# Patient Record
Sex: FEMALE | Race: WHITE | NOT HISPANIC OR LATINO | Employment: OTHER | URBAN - METROPOLITAN AREA
[De-identification: names, ages, dates, MRNs, and addresses within clinical notes are randomized per-mention and may not be internally consistent; named-entity substitution may affect disease eponyms.]

---

## 2023-03-09 ENCOUNTER — TELEPHONE (OUTPATIENT)
Dept: PRIMARY CARE | Facility: CLINIC | Age: 78
End: 2023-03-09
Payer: MEDICARE

## 2023-03-09 NOTE — TELEPHONE ENCOUNTER
PT WANTS TO KNOW IF SHE SHOULD CONTINUE USING ADVAIR AND SINCE SHES NO LONGER HAVING THE ISSUE SHOULD SHE STILL SEE DR. HOYOS. PLEASE ADVISE.

## 2023-03-25 DIAGNOSIS — J45.40 MODERATE PERSISTENT ASTHMA WITHOUT COMPLICATION (HHS-HCC): Primary | ICD-10-CM

## 2023-03-25 DIAGNOSIS — E78.5 HYPERLIPIDEMIA, UNSPECIFIED HYPERLIPIDEMIA TYPE: ICD-10-CM

## 2023-03-27 RX ORDER — FLUTICASONE PROPIONATE AND SALMETEROL 250; 50 UG/1; UG/1
POWDER RESPIRATORY (INHALATION)
Qty: 60 EACH | Refills: 0 | Status: SHIPPED | OUTPATIENT
Start: 2023-03-27 | End: 2023-03-30 | Stop reason: SDUPTHER

## 2023-03-28 RX ORDER — SIMVASTATIN 20 MG/1
20 TABLET, FILM COATED ORAL DAILY
Qty: 90 TABLET | Refills: 1 | Status: SHIPPED | OUTPATIENT
Start: 2023-03-28 | End: 2023-09-27 | Stop reason: SDUPTHER

## 2023-03-28 RX ORDER — SIMVASTATIN 20 MG/1
20 TABLET, FILM COATED ORAL DAILY
COMMUNITY
End: 2023-03-28 | Stop reason: SDUPTHER

## 2023-03-30 DIAGNOSIS — J45.40 MODERATE PERSISTENT ASTHMA WITHOUT COMPLICATION (HHS-HCC): ICD-10-CM

## 2023-03-30 RX ORDER — FLUTICASONE PROPIONATE AND SALMETEROL 250; 50 UG/1; UG/1
POWDER RESPIRATORY (INHALATION)
Qty: 60 EACH | Refills: 1 | Status: SHIPPED | OUTPATIENT
Start: 2023-03-30 | End: 2023-06-20

## 2023-04-24 NOTE — PROGRESS NOTES
"Subjective   Reason for Visit: Sunni Pérez is an 77 y.o. female here for a Medicare Wellness visit and followup of chronic medical conditions.     - For the last week had experienced a \"stomach thing\" without vomiting, has noted soft stools but not normal bowel movements, no diarrhea, that lasted two days, has had some stomach discomfort but did not stop her from doing anything, though again it started again this morning. Her stomach has not been exactly right for about a week. Appetite is the same, no melena or hematochezia, no constitutional symptoms, overall feels ok, able to eat and drink.  Symptoms initially got better and then got worse.     Past Medical, Surgical, and Family History reviewed and updated in chart.PMHx:  -Invasive mammary carcinoma, ductal carcinoma in situ status post right partial mastectomy and sentinel lymph node biopsy with negative margins on June 2019, opted not to pursue radiation, on anastrozole, last seen by heme-onc in July 2022 recommended to see dermatology twice a year, followed by Dr. Sutherland has had recent mammogram which showed stable findings recommended one year followup.   -Asthma - moderate persistent on advair daily and prn, recommended for PFTs. She gets frequent asthmatic bronchitis episodes. She had another episode a few months ago where she took flonase which helped her symptoms as well as ayr. There is no prn use of advair.   - Macular degeneration followed by optho q6 months   - CKD 3  - HTN -  on amlodipine 5mg history of hypertensive emergency post covid vaccination, measures home bP readings ranging between 120s-130s at the most.   - Depression and anxiety on venlafaxine- working well for her.   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 Tania Irvin.   - HLD - simvastatin 40 + 20mg   - Vitamin D deficiency on 6000 units   - Osteopenia - last bone density 1/22. on vitamin D   - GERD on omeprazole 20mg " longstanding   - LBP chronic inability to exercise   - Prediabetes with metabolic syndrome     Social:   - Retired - previously worked at a bank   - Lives at home with    - Former remote smoker (1PPD x 2 years)     Reviewed all medications by prescribing practitioner or clinical pharmacist (such as prescriptions, OTCs, herbal therapies and supplements) and documented in the medical record.      HPI    Patient Care Team:  Carina Gillette DO as PCP - General  Gretel Worthington MD as PCP - [facility] Attributed Provider     Review of Systems    Objective   Vitals:  /72   Pulse 72   Temp 36.6 °C (97.9 °F)   Wt 77.3 kg (170 lb 6 oz)   BMI 33.27 kg/m²       Physical Exam  General: Appears comfortable, NAD, appropriate affect  HEENT: NCAT, EOMI, pupils symmetric, no conjunctival erythema   Neck: Supple, no LAD   Heart: RRR S1 S2 no murmurs appreciated   Lungs: CTA bilaterally, no rhonchi, rales, or wheezes   Abdomen: Soft, NT/ND, no rebound or guarding, NABS   Extremities: no cyanosis or edema appreciated  Neuro: AAO x 3, answers questions appropriately, no FND, gait unremarkable    Assessment/Plan   Problem List Items Addressed This Visit          Respiratory    Moderate persistent asthma without complication    Current Assessment & Plan     Now maintained on advair with significant improvement in symptoms, no recent exacerbations and has improved frequency of exacerbations.   Will continue same for now, hold off on pulmonology referral. Encouragement provided, declines PFTs at present.            Circulatory    Benign essential HTN - Primary    Current Assessment & Plan     Controlled today on amlodipine, no changes in regimen.   Continue lifestyle modifications.         Relevant Orders    CBC and Auto Differential    Comprehensive Metabolic Panel    Lipid Panel    TSH with reflex to Free T4 if abnormal    Vitamin D, Total       Digestive    Gastroesophageal reflux disease without esophagitis     Current Assessment & Plan     On chronic PPI not interested in further surveillance EGD at present.            Genitourinary    Stage 3a chronic kidney disease    Current Assessment & Plan     Mild, due for repeat bloodwork, will obtain, likely secondary to hypertension. Check urine studies.         Relevant Orders    Hemoglobin A1C    Urinalysis with Reflex Microscopic    Follow Up In [facility] - PCP       Musculoskeletal    Osteopenia    Current Assessment & Plan     Last bone density 1/2022 due for repeat in 1 year.   Continue calcium and vitamin D supplementation, continue weight bearing exercises if able.            Endocrine/Metabolic    Prediabetes    Current Assessment & Plan     Healthy lifestyle reviewed.         Relevant Orders    Hemoglobin A1C       Other    Depression with anxiety    Current Assessment & Plan     Controlled on venlafaxine not interested in dosage adjustment.         Malignant neoplasm of female breast, unspecified estrogen receptor status, unspecified laterality, unspecified site of breast (CMS/[facility])    Current Assessment & Plan     Maintained on anastrazole, s/p right partial mastectomy, recent mammogram wnl, seen by Dr. Sutherland recommended one year followup.          Hypercholesterolemia    Current Assessment & Plan     On simvastatin 40+20 with persistent hypertriglyceridemia, counseling provided on lifestyle modifications. May consider switching to higher potency statin. Will recheck lipid profile.          Macular degeneration    Current Assessment & Plan     Followed by optho q6 months          Encounter for preventative adult health care examination    Current Assessment & Plan     Adult health exam   - Colonoscopy or fecal testing declined   - Mammo UTD   - Pap no longer required   - Skin followed q6 months   - Vaccinations: UTD with flu, pneumococcal, Tdap will consider, Shingrix recommended (has had shingles in the past)           Other Visit Diagnoses        Disorder of bone, unspecified        Relevant Orders    Vitamin D, Total    Healthcare maintenance        Relevant Orders    Follow Up In [facility] - PCP    Routine general medical examination at health care facility                 Followup yearly

## 2023-04-25 ENCOUNTER — OFFICE VISIT (OUTPATIENT)
Dept: PRIMARY CARE | Facility: CLINIC | Age: 78
End: 2023-04-25
Payer: MEDICARE

## 2023-04-25 VITALS
DIASTOLIC BLOOD PRESSURE: 72 MMHG | TEMPERATURE: 97.9 F | WEIGHT: 170.38 LBS | SYSTOLIC BLOOD PRESSURE: 131 MMHG | BODY MASS INDEX: 33.27 KG/M2 | HEART RATE: 72 BPM

## 2023-04-25 DIAGNOSIS — Z00.00 ROUTINE GENERAL MEDICAL EXAMINATION AT HEALTH CARE FACILITY: ICD-10-CM

## 2023-04-25 DIAGNOSIS — N18.31 STAGE 3A CHRONIC KIDNEY DISEASE (MULTI): ICD-10-CM

## 2023-04-25 DIAGNOSIS — H35.30 MACULAR DEGENERATION, UNSPECIFIED LATERALITY, UNSPECIFIED TYPE: ICD-10-CM

## 2023-04-25 DIAGNOSIS — Z00.00 ENCOUNTER FOR PREVENTATIVE ADULT HEALTH CARE EXAMINATION: ICD-10-CM

## 2023-04-25 DIAGNOSIS — R73.03 PREDIABETES: ICD-10-CM

## 2023-04-25 DIAGNOSIS — E78.00 HYPERCHOLESTEROLEMIA: ICD-10-CM

## 2023-04-25 DIAGNOSIS — C50.919 MALIGNANT NEOPLASM OF FEMALE BREAST, UNSPECIFIED ESTROGEN RECEPTOR STATUS, UNSPECIFIED LATERALITY, UNSPECIFIED SITE OF BREAST (MULTI): ICD-10-CM

## 2023-04-25 DIAGNOSIS — Z00.00 HEALTHCARE MAINTENANCE: ICD-10-CM

## 2023-04-25 DIAGNOSIS — F41.8 DEPRESSION WITH ANXIETY: ICD-10-CM

## 2023-04-25 DIAGNOSIS — J45.40 MODERATE PERSISTENT ASTHMA WITHOUT COMPLICATION (HHS-HCC): ICD-10-CM

## 2023-04-25 DIAGNOSIS — I10 BENIGN ESSENTIAL HTN: Primary | ICD-10-CM

## 2023-04-25 DIAGNOSIS — K21.9 GASTROESOPHAGEAL REFLUX DISEASE WITHOUT ESOPHAGITIS: ICD-10-CM

## 2023-04-25 DIAGNOSIS — M85.80 OSTEOPENIA, UNSPECIFIED LOCATION: ICD-10-CM

## 2023-04-25 DIAGNOSIS — M89.9 DISORDER OF BONE, UNSPECIFIED: ICD-10-CM

## 2023-04-25 PROBLEM — N18.30 CKD (CHRONIC KIDNEY DISEASE), STAGE III (MULTI): Status: ACTIVE | Noted: 2023-04-25

## 2023-04-25 PROBLEM — Z78.9 HEALTH MAINTENANCE ALTERATION: Status: ACTIVE | Noted: 2023-04-25

## 2023-04-25 PROBLEM — Z78.9 HEALTH MAINTENANCE ALTERATION: Status: RESOLVED | Noted: 2023-04-25 | Resolved: 2023-04-25

## 2023-04-25 PROBLEM — J45.909 ASTHMA (HHS-HCC): Status: ACTIVE | Noted: 2023-04-25

## 2023-04-25 LAB
ALANINE AMINOTRANSFERASE (SGPT) (U/L) IN SER/PLAS: 22 U/L (ref 7–45)
ALBUMIN (G/DL) IN SER/PLAS: 4.4 G/DL (ref 3.4–5)
ALKALINE PHOSPHATASE (U/L) IN SER/PLAS: 124 U/L (ref 33–136)
ANION GAP IN SER/PLAS: 13 MMOL/L (ref 10–20)
APPEARANCE, URINE: ABNORMAL
ASPARTATE AMINOTRANSFERASE (SGOT) (U/L) IN SER/PLAS: 22 U/L (ref 9–39)
BASOPHILS (10*3/UL) IN BLOOD BY AUTOMATED COUNT: 0.05 X10E9/L (ref 0–0.1)
BASOPHILS/100 LEUKOCYTES IN BLOOD BY AUTOMATED COUNT: 0.6 % (ref 0–2)
BILIRUBIN TOTAL (MG/DL) IN SER/PLAS: 0.5 MG/DL (ref 0–1.2)
BILIRUBIN, URINE: NEGATIVE
BLOOD, URINE: NEGATIVE
CALCIDIOL (25 OH VITAMIN D3) (NG/ML) IN SER/PLAS: 45 NG/ML
CALCIUM (MG/DL) IN SER/PLAS: 10 MG/DL (ref 8.6–10.6)
CARBON DIOXIDE, TOTAL (MMOL/L) IN SER/PLAS: 26 MMOL/L (ref 21–32)
CHLORIDE (MMOL/L) IN SER/PLAS: 108 MMOL/L (ref 98–107)
CHOLESTEROL (MG/DL) IN SER/PLAS: 163 MG/DL (ref 0–199)
CHOLESTEROL IN HDL (MG/DL) IN SER/PLAS: 41.2 MG/DL
CHOLESTEROL/HDL RATIO: 4
COLOR, URINE: ABNORMAL
CREATININE (MG/DL) IN SER/PLAS: 0.87 MG/DL (ref 0.5–1.05)
EOSINOPHILS (10*3/UL) IN BLOOD BY AUTOMATED COUNT: 0.42 X10E9/L (ref 0–0.4)
EOSINOPHILS/100 LEUKOCYTES IN BLOOD BY AUTOMATED COUNT: 5.1 % (ref 0–6)
ERYTHROCYTE DISTRIBUTION WIDTH (RATIO) BY AUTOMATED COUNT: 14.2 % (ref 11.5–14.5)
ERYTHROCYTE MEAN CORPUSCULAR HEMOGLOBIN CONCENTRATION (G/DL) BY AUTOMATED: 31.5 G/DL (ref 32–36)
ERYTHROCYTE MEAN CORPUSCULAR VOLUME (FL) BY AUTOMATED COUNT: 86 FL (ref 80–100)
ERYTHROCYTES (10*6/UL) IN BLOOD BY AUTOMATED COUNT: 4.43 X10E12/L (ref 4–5.2)
GFR FEMALE: 68 ML/MIN/1.73M2
GLUCOSE (MG/DL) IN SER/PLAS: 93 MG/DL (ref 74–99)
GLUCOSE, URINE: NEGATIVE MG/DL
HEMATOCRIT (%) IN BLOOD BY AUTOMATED COUNT: 38.1 % (ref 36–46)
HEMOGLOBIN (G/DL) IN BLOOD: 12 G/DL (ref 12–16)
HYALINE CASTS, URINE: ABNORMAL /LPF
IMMATURE GRANULOCYTES/100 LEUKOCYTES IN BLOOD BY AUTOMATED COUNT: 0.6 % (ref 0–0.9)
KETONES, URINE: NEGATIVE MG/DL
LDL: ABNORMAL MG/DL (ref 0–99)
LEUKOCYTE ESTERASE, URINE: ABNORMAL
LEUKOCYTES (10*3/UL) IN BLOOD BY AUTOMATED COUNT: 8.2 X10E9/L (ref 4.4–11.3)
LYMPHOCYTES (10*3/UL) IN BLOOD BY AUTOMATED COUNT: 2.42 X10E9/L (ref 0.8–3)
LYMPHOCYTES/100 LEUKOCYTES IN BLOOD BY AUTOMATED COUNT: 29.7 % (ref 13–44)
MONOCYTES (10*3/UL) IN BLOOD BY AUTOMATED COUNT: 0.59 X10E9/L (ref 0.05–0.8)
MONOCYTES/100 LEUKOCYTES IN BLOOD BY AUTOMATED COUNT: 7.2 % (ref 2–10)
MUCUS, URINE: ABNORMAL /LPF
NEUTROPHILS (10*3/UL) IN BLOOD BY AUTOMATED COUNT: 4.63 X10E9/L (ref 1.6–5.5)
NEUTROPHILS/100 LEUKOCYTES IN BLOOD BY AUTOMATED COUNT: 56.8 % (ref 40–80)
NITRITE, URINE: NEGATIVE
NON HDL CHOLESTEROL: 122 MG/DL
NRBC (PER 100 WBCS) BY AUTOMATED COUNT: 0 /100 WBC (ref 0–0)
PH, URINE: 5 (ref 5–8)
PLATELETS (10*3/UL) IN BLOOD AUTOMATED COUNT: 230 X10E9/L (ref 150–450)
POTASSIUM (MMOL/L) IN SER/PLAS: 4 MMOL/L (ref 3.5–5.3)
PROTEIN TOTAL: 6.7 G/DL (ref 6.4–8.2)
PROTEIN, URINE: NEGATIVE MG/DL
RBC, URINE: 2 /HPF (ref 0–5)
SODIUM (MMOL/L) IN SER/PLAS: 143 MMOL/L (ref 136–145)
SPECIFIC GRAVITY, URINE: 1.02 (ref 1–1.03)
SQUAMOUS EPITHELIAL CELLS, URINE: 1 /HPF
THYROTROPIN (MIU/L) IN SER/PLAS BY DETECTION LIMIT <= 0.05 MIU/L: 1.83 MIU/L (ref 0.44–3.98)
TRIGLYCERIDE (MG/DL) IN SER/PLAS: 408 MG/DL (ref 0–149)
UREA NITROGEN (MG/DL) IN SER/PLAS: 22 MG/DL (ref 6–23)
UROBILINOGEN, URINE: <2 MG/DL (ref 0–1.9)
VLDL: ABNORMAL MG/DL (ref 0–40)
WBC, URINE: 1 /HPF (ref 0–5)

## 2023-04-25 PROCEDURE — 3078F DIAST BP <80 MM HG: CPT | Performed by: INTERNAL MEDICINE

## 2023-04-25 PROCEDURE — 3075F SYST BP GE 130 - 139MM HG: CPT | Performed by: INTERNAL MEDICINE

## 2023-04-25 PROCEDURE — 99214 OFFICE O/P EST MOD 30 MIN: CPT | Performed by: INTERNAL MEDICINE

## 2023-04-25 PROCEDURE — G0439 PPPS, SUBSEQ VISIT: HCPCS | Performed by: INTERNAL MEDICINE

## 2023-04-25 PROCEDURE — 1170F FXNL STATUS ASSESSED: CPT | Performed by: INTERNAL MEDICINE

## 2023-04-25 PROCEDURE — 1159F MED LIST DOCD IN RCRD: CPT | Performed by: INTERNAL MEDICINE

## 2023-04-25 PROCEDURE — 1160F RVW MEDS BY RX/DR IN RCRD: CPT | Performed by: INTERNAL MEDICINE

## 2023-04-25 PROCEDURE — 1157F ADVNC CARE PLAN IN RCRD: CPT | Performed by: INTERNAL MEDICINE

## 2023-04-25 PROCEDURE — 1036F TOBACCO NON-USER: CPT | Performed by: INTERNAL MEDICINE

## 2023-04-25 RX ORDER — OMEPRAZOLE 20 MG/1
20 CAPSULE, DELAYED RELEASE ORAL
COMMUNITY
End: 2023-05-16

## 2023-04-25 RX ORDER — VIT C/E/ZN/COPPR/LUTEIN/ZEAXAN 250MG-90MG
CAPSULE ORAL
COMMUNITY

## 2023-04-25 RX ORDER — AMLODIPINE BESYLATE 5 MG/1
5 TABLET ORAL DAILY
COMMUNITY
End: 2023-05-16

## 2023-04-25 RX ORDER — FLUTICASONE PROPIONATE 50 MCG
2 SPRAY, SUSPENSION (ML) NASAL DAILY
COMMUNITY
Start: 2023-01-20

## 2023-04-25 RX ORDER — SIMVASTATIN 40 MG/1
40 TABLET, FILM COATED ORAL DAILY
COMMUNITY
End: 2023-05-30 | Stop reason: SDUPTHER

## 2023-04-25 RX ORDER — ALBUTEROL SULFATE 0.83 MG/ML
SOLUTION RESPIRATORY (INHALATION)
COMMUNITY
Start: 2023-02-10 | End: 2024-03-21 | Stop reason: WASHOUT

## 2023-04-25 RX ORDER — CHOLECALCIFEROL (VITAMIN D3) 50 MCG
TABLET ORAL
COMMUNITY

## 2023-04-25 RX ORDER — VENLAFAXINE HYDROCHLORIDE 150 MG/1
150 CAPSULE, EXTENDED RELEASE ORAL DAILY
COMMUNITY
End: 2023-05-16

## 2023-04-25 RX ORDER — ALBUTEROL SULFATE 90 UG/1
AEROSOL, METERED RESPIRATORY (INHALATION)
COMMUNITY
Start: 2023-01-19 | End: 2024-03-21 | Stop reason: WASHOUT

## 2023-04-25 ASSESSMENT — ACTIVITIES OF DAILY LIVING (ADL)
BATHING: INDEPENDENT
MANAGING_FINANCES: INDEPENDENT
DOING_HOUSEWORK: INDEPENDENT
GROCERY_SHOPPING: INDEPENDENT
TAKING_MEDICATION: INDEPENDENT
DRESSING: INDEPENDENT

## 2023-04-25 ASSESSMENT — PATIENT HEALTH QUESTIONNAIRE - PHQ9
SUM OF ALL RESPONSES TO PHQ9 QUESTIONS 1 AND 2: 0
1. LITTLE INTEREST OR PLEASURE IN DOING THINGS: NOT AT ALL
2. FEELING DOWN, DEPRESSED OR HOPELESS: NOT AT ALL

## 2023-04-25 NOTE — ASSESSMENT & PLAN NOTE
On simvastatin 40+20 with persistent hypertriglyceridemia, counseling provided on lifestyle modifications. May consider switching to higher potency statin. Will recheck lipid profile.

## 2023-04-25 NOTE — ASSESSMENT & PLAN NOTE
Last bone density 1/2022 due for repeat in 1 year.   Continue calcium and vitamin D supplementation, continue weight bearing exercises if able.

## 2023-04-25 NOTE — ASSESSMENT & PLAN NOTE
Adult health exam   - Colonoscopy or fecal testing declined   - Mammo UTD   - Pap no longer required   - Skin followed q6 months   - Vaccinations: UTD with flu, pneumococcal, Tdap will consider, Shingrix recommended (has had shingles in the past)

## 2023-04-25 NOTE — ASSESSMENT & PLAN NOTE
Mild, due for repeat bloodwork, will obtain, likely secondary to hypertension. Check urine studies.

## 2023-04-25 NOTE — ASSESSMENT & PLAN NOTE
Now maintained on advair with significant improvement in symptoms, no recent exacerbations and has improved frequency of exacerbations.   Will continue same for now, hold off on pulmonology referral. Encouragement provided, declines PFTs at present.

## 2023-04-25 NOTE — ASSESSMENT & PLAN NOTE
Maintained on anastrazole, s/p right partial mastectomy, recent mammogram wnl, seen by Dr. Sutherland recommended one year followup.

## 2023-05-04 ENCOUNTER — TELEPHONE (OUTPATIENT)
Dept: PRIMARY CARE | Facility: CLINIC | Age: 78
End: 2023-05-04

## 2023-05-16 DIAGNOSIS — I10 BENIGN ESSENTIAL HTN: ICD-10-CM

## 2023-05-16 DIAGNOSIS — F41.8 DEPRESSION WITH ANXIETY: ICD-10-CM

## 2023-05-16 DIAGNOSIS — K21.9 GASTROESOPHAGEAL REFLUX DISEASE WITHOUT ESOPHAGITIS: Primary | ICD-10-CM

## 2023-05-16 RX ORDER — AMLODIPINE BESYLATE 5 MG/1
TABLET ORAL
Qty: 90 TABLET | Refills: 0 | Status: SHIPPED | OUTPATIENT
Start: 2023-05-16 | End: 2023-08-15

## 2023-05-16 RX ORDER — VENLAFAXINE HYDROCHLORIDE 150 MG/1
CAPSULE, EXTENDED RELEASE ORAL
Qty: 90 CAPSULE | Refills: 0 | Status: SHIPPED | OUTPATIENT
Start: 2023-05-16 | End: 2023-05-24

## 2023-05-16 RX ORDER — OMEPRAZOLE 20 MG/1
CAPSULE, DELAYED RELEASE ORAL
Qty: 90 CAPSULE | Refills: 0 | Status: SHIPPED | OUTPATIENT
Start: 2023-05-16 | End: 2023-08-15

## 2023-05-24 DIAGNOSIS — F41.8 DEPRESSION WITH ANXIETY: ICD-10-CM

## 2023-05-24 RX ORDER — VENLAFAXINE HYDROCHLORIDE 150 MG/1
CAPSULE, EXTENDED RELEASE ORAL
Qty: 90 CAPSULE | Refills: 3 | Status: SHIPPED | OUTPATIENT
Start: 2023-05-24

## 2023-05-30 DIAGNOSIS — E78.00 HYPERCHOLESTEROLEMIA: Primary | ICD-10-CM

## 2023-05-30 RX ORDER — SIMVASTATIN 40 MG/1
40 TABLET, FILM COATED ORAL DAILY
Qty: 90 TABLET | Refills: 1 | Status: SHIPPED | OUTPATIENT
Start: 2023-05-30 | End: 2023-11-27

## 2023-06-19 DIAGNOSIS — J45.40 MODERATE PERSISTENT ASTHMA WITHOUT COMPLICATION (HHS-HCC): ICD-10-CM

## 2023-06-20 RX ORDER — FLUTICASONE PROPIONATE AND SALMETEROL 250; 50 UG/1; UG/1
POWDER RESPIRATORY (INHALATION)
Qty: 60 EACH | Refills: 2 | Status: SHIPPED | OUTPATIENT
Start: 2023-06-20 | End: 2023-09-11

## 2023-09-09 DIAGNOSIS — J45.40 MODERATE PERSISTENT ASTHMA WITHOUT COMPLICATION (HHS-HCC): ICD-10-CM

## 2023-09-11 RX ORDER — FLUTICASONE PROPIONATE AND SALMETEROL 250; 50 UG/1; UG/1
POWDER RESPIRATORY (INHALATION)
Qty: 60 EACH | Refills: 3 | Status: SHIPPED | OUTPATIENT
Start: 2023-09-11 | End: 2024-01-04

## 2023-09-27 DIAGNOSIS — E78.5 HYPERLIPIDEMIA, UNSPECIFIED HYPERLIPIDEMIA TYPE: ICD-10-CM

## 2023-09-27 RX ORDER — SIMVASTATIN 20 MG/1
20 TABLET, FILM COATED ORAL DAILY
Qty: 90 TABLET | Refills: 1 | Status: SHIPPED | OUTPATIENT
Start: 2023-09-27

## 2023-11-24 DIAGNOSIS — E78.00 HYPERCHOLESTEROLEMIA: ICD-10-CM

## 2023-11-27 RX ORDER — SIMVASTATIN 40 MG/1
40 TABLET, FILM COATED ORAL DAILY
Qty: 90 TABLET | Refills: 0 | Status: SHIPPED | OUTPATIENT
Start: 2023-11-27 | End: 2024-02-28

## 2023-12-31 DIAGNOSIS — J45.40 MODERATE PERSISTENT ASTHMA WITHOUT COMPLICATION (HHS-HCC): ICD-10-CM

## 2024-01-04 RX ORDER — FLUTICASONE PROPIONATE AND SALMETEROL 250; 50 UG/1; UG/1
POWDER RESPIRATORY (INHALATION)
Qty: 60 EACH | Refills: 1 | Status: SHIPPED | OUTPATIENT
Start: 2024-01-04 | End: 2024-04-16

## 2024-01-12 ENCOUNTER — TELEPHONE (OUTPATIENT)
Dept: PRIMARY CARE | Facility: CLINIC | Age: 79
End: 2024-01-12
Payer: MEDICARE

## 2024-02-20 ENCOUNTER — APPOINTMENT (OUTPATIENT)
Dept: OBSTETRICS AND GYNECOLOGY | Facility: CLINIC | Age: 79
End: 2024-02-20
Payer: MEDICARE

## 2024-02-25 DIAGNOSIS — E78.00 HYPERCHOLESTEROLEMIA: ICD-10-CM

## 2024-02-28 PROBLEM — H26.9 CATARACT: Status: ACTIVE | Noted: 2024-02-28

## 2024-02-28 PROBLEM — H35.3131 NONEXUDATIVE AGE-RELATED MACULAR DEGENERATION, BILATERAL, EARLY DRY STAGE: Status: ACTIVE | Noted: 2017-10-11

## 2024-02-28 PROBLEM — H43.822 VITREOMACULAR TRACTION SYNDROME OF LEFT EYE: Status: ACTIVE | Noted: 2017-03-06

## 2024-02-28 PROBLEM — N95.2 ATROPHIC VAGINITIS: Status: ACTIVE | Noted: 2024-02-28

## 2024-02-28 PROBLEM — B02.9 HERPES ZOSTER: Status: ACTIVE | Noted: 2024-02-28

## 2024-02-28 PROBLEM — J01.90 ACUTE SINUSITIS: Status: ACTIVE | Noted: 2024-02-28

## 2024-02-28 PROBLEM — R05.9 COUGH: Status: ACTIVE | Noted: 2024-02-28

## 2024-02-28 PROBLEM — N64.4 BREAST PAIN, LEFT: Status: ACTIVE | Noted: 2024-02-28

## 2024-02-28 PROBLEM — H18.231 SECONDARY CORNEAL EDEMA OF RIGHT EYE: Status: ACTIVE | Noted: 2017-09-06

## 2024-02-28 PROBLEM — R06.09 DYSPNEA ON EFFORT: Status: ACTIVE | Noted: 2024-02-28

## 2024-02-28 PROBLEM — H35.711 CENTRAL SEROUS CHORIORETINOPATHY, RIGHT EYE: Status: ACTIVE | Noted: 2017-08-23

## 2024-02-28 PROBLEM — R10.30 ABDOMINAL PAIN, LOWER: Status: ACTIVE | Noted: 2024-02-28

## 2024-02-28 PROBLEM — M54.50 BACK PAIN, LUMBOSACRAL: Status: ACTIVE | Noted: 2024-02-28

## 2024-02-28 PROBLEM — H04.123 DRY EYE SYNDROME OF BILATERAL LACRIMAL GLANDS: Status: ACTIVE | Noted: 2017-09-11

## 2024-02-28 PROBLEM — R68.89 NONSPECIFIC ABNORMAL FINDING: Status: ACTIVE | Noted: 2024-02-28

## 2024-02-28 PROBLEM — R09.02 HYPOXIA: Status: ACTIVE | Noted: 2024-02-28

## 2024-02-28 PROBLEM — N84.1 ENDOCERVICAL POLYP: Status: ACTIVE | Noted: 2024-02-28

## 2024-02-28 RX ORDER — LORAZEPAM 0.5 MG/1
TABLET ORAL
COMMUNITY
Start: 2021-09-27 | End: 2024-03-21 | Stop reason: WASHOUT

## 2024-02-28 RX ORDER — SIMVASTATIN 40 MG/1
40 TABLET, FILM COATED ORAL DAILY
Qty: 90 TABLET | Refills: 0 | Status: SHIPPED | OUTPATIENT
Start: 2024-02-28 | End: 2024-03-21 | Stop reason: WASHOUT

## 2024-02-28 RX ORDER — MULTIVITAMIN
TABLET ORAL
COMMUNITY

## 2024-02-28 RX ORDER — SODIUM CHLORIDE 0.65 %
AEROSOL, SPRAY (ML) NASAL 4 TIMES DAILY
COMMUNITY

## 2024-02-28 RX ORDER — ACETAMINOPHEN 325 MG/1
650 TABLET ORAL EVERY 4 HOURS PRN
COMMUNITY
Start: 2014-10-25

## 2024-02-28 RX ORDER — IBANDRONATE SODIUM 150 MG/1
TABLET, FILM COATED ORAL
COMMUNITY
Start: 2022-02-11 | End: 2024-03-21 | Stop reason: WASHOUT

## 2024-02-28 RX ORDER — ANASTROZOLE 1 MG/1
1 TABLET ORAL DAILY
COMMUNITY
End: 2024-03-13 | Stop reason: SDUPTHER

## 2024-03-13 ENCOUNTER — TELEPHONE (OUTPATIENT)
Dept: HEMATOLOGY/ONCOLOGY | Facility: HOSPITAL | Age: 79
End: 2024-03-13

## 2024-03-13 DIAGNOSIS — C50.919 MALIGNANT NEOPLASM OF FEMALE BREAST, UNSPECIFIED ESTROGEN RECEPTOR STATUS, UNSPECIFIED LATERALITY, UNSPECIFIED SITE OF BREAST (MULTI): Primary | ICD-10-CM

## 2024-03-13 RX ORDER — ANASTROZOLE 1 MG/1
1 TABLET ORAL DAILY
Qty: 30 TABLET | Refills: 2 | Status: SHIPPED | OUTPATIENT
Start: 2024-03-13

## 2024-03-21 ENCOUNTER — OFFICE VISIT (OUTPATIENT)
Dept: PRIMARY CARE | Facility: CLINIC | Age: 79
End: 2024-03-21
Payer: MEDICARE

## 2024-03-21 VITALS
WEIGHT: 170 LBS | BODY MASS INDEX: 33.2 KG/M2 | DIASTOLIC BLOOD PRESSURE: 69 MMHG | TEMPERATURE: 98.2 F | SYSTOLIC BLOOD PRESSURE: 129 MMHG | HEART RATE: 65 BPM | OXYGEN SATURATION: 96 %

## 2024-03-21 DIAGNOSIS — H35.30 MACULAR DEGENERATION, UNSPECIFIED LATERALITY, UNSPECIFIED TYPE: Primary | ICD-10-CM

## 2024-03-21 DIAGNOSIS — M54.50 CHRONIC LEFT-SIDED LOW BACK PAIN WITHOUT SCIATICA: ICD-10-CM

## 2024-03-21 DIAGNOSIS — J45.40 MODERATE PERSISTENT ASTHMA WITHOUT COMPLICATION (HHS-HCC): ICD-10-CM

## 2024-03-21 DIAGNOSIS — E78.00 HYPERCHOLESTEROLEMIA: ICD-10-CM

## 2024-03-21 DIAGNOSIS — G89.29 CHRONIC LEFT-SIDED LOW BACK PAIN WITHOUT SCIATICA: ICD-10-CM

## 2024-03-21 DIAGNOSIS — C50.919 MALIGNANT NEOPLASM OF FEMALE BREAST, UNSPECIFIED ESTROGEN RECEPTOR STATUS, UNSPECIFIED LATERALITY, UNSPECIFIED SITE OF BREAST (MULTI): ICD-10-CM

## 2024-03-21 PROBLEM — R06.09 DYSPNEA ON EFFORT: Status: RESOLVED | Noted: 2024-02-28 | Resolved: 2024-03-21

## 2024-03-21 PROBLEM — Z00.00 ENCOUNTER FOR PREVENTATIVE ADULT HEALTH CARE EXAMINATION: Status: RESOLVED | Noted: 2023-04-25 | Resolved: 2024-03-21

## 2024-03-21 PROBLEM — R05.9 COUGH: Status: RESOLVED | Noted: 2024-02-28 | Resolved: 2024-03-21

## 2024-03-21 PROCEDURE — 1159F MED LIST DOCD IN RCRD: CPT | Performed by: INTERNAL MEDICINE

## 2024-03-21 PROCEDURE — 1036F TOBACCO NON-USER: CPT | Performed by: INTERNAL MEDICINE

## 2024-03-21 PROCEDURE — 1160F RVW MEDS BY RX/DR IN RCRD: CPT | Performed by: INTERNAL MEDICINE

## 2024-03-21 PROCEDURE — 99214 OFFICE O/P EST MOD 30 MIN: CPT | Performed by: INTERNAL MEDICINE

## 2024-03-21 PROCEDURE — 1157F ADVNC CARE PLAN IN RCRD: CPT | Performed by: INTERNAL MEDICINE

## 2024-03-21 PROCEDURE — 3078F DIAST BP <80 MM HG: CPT | Performed by: INTERNAL MEDICINE

## 2024-03-21 PROCEDURE — 3074F SYST BP LT 130 MM HG: CPT | Performed by: INTERNAL MEDICINE

## 2024-03-21 RX ORDER — ROSUVASTATIN CALCIUM 10 MG/1
10 TABLET, COATED ORAL DAILY
Qty: 100 TABLET | Refills: 3 | Status: SHIPPED | OUTPATIENT
Start: 2024-03-21 | End: 2025-04-25

## 2024-03-21 ASSESSMENT — PATIENT HEALTH QUESTIONNAIRE - PHQ9
1. LITTLE INTEREST OR PLEASURE IN DOING THINGS: NOT AT ALL
SUM OF ALL RESPONSES TO PHQ9 QUESTIONS 1 AND 2: 0
2. FEELING DOWN, DEPRESSED OR HOPELESS: NOT AT ALL

## 2024-03-21 NOTE — ASSESSMENT & PLAN NOTE
Chronic longstanding back pain without alarm symptoms, has been treated with injections and PT in the past with some success, does home exercise  Refer to PT.   If persists or worsens, will pursue imaging

## 2024-03-21 NOTE — PROGRESS NOTES
Subjective   Patient ID: Sunni Pérez is a 78 y.o. female who presents for No chief complaint on file..  HPI  78-year-old female here to concern for cough and congestion and possible hernia.  She was last seen in April of last year.  Due for annual wellness visit    4/23: hyeprtrigs otherwise labs good   1/24: cough cough reduce advair to prn use and treat upper airway symptoms.   2/24: switch off simvastatin also on amlodipne    PMHx:  - Invasive mammary carcinoma, ductal carcinoma in situ status post right partial mastectomy and sentinel lymph node biopsy with negative margins on June 2019, opted not to pursue radiation, on anastrozole, last seen by heme-onc in July 2022 recommended to see dermatology twice a year, followed by Arianne Sutherland has had recent mammogram which showed stable findings recommended one year followup upcoming visit scheduled. May be coming off anastrozole.   - Asthma - moderate persistent - on advair daily and prn, recommended for PFTs. She gets frequent asthmatic bronchitis episodes. She had another episode a few months ago where she took flonase which helped her symptoms as well as ayr. There is no prn use of advair.   - Macular degeneration - followed by optho q6 months   - HTN -  on amlodipine 5mg history of hypertensive emergency post covid vaccination  - Depression and anxiety on venlafaxine- working well for her.   - SCC - left lower extremity followed by dermatology [id number] Dr. Tania Irvin.   - HLD - simvastatin 40 + 20mg - persistent hypertriglyceridemia   - Vitamin D deficiency on 6000 units   - Osteopenia - last bone density 1/22. on vitamin D   - GERD - on omeprazole 20mg longstanding   - LBP chronic inability to exercise   - Prediabetes - with metabolic syndrome      Social:   - Retired - previously worked at a bank   - Lives at home with    - Former remote smoker (1PPD x 2 years)   Current Outpatient Medications   Medication Instructions    acetaminophen (TYLENOL)  650 mg, oral, Every 4 hours PRN    amLODIPine (Norvasc) 5 mg tablet TAKE 1 TABLET BY MOUTH ONCE DAILY AS DIRECTED    anastrozole (ARIMIDEX) 1 mg, oral, Daily    cholecalciferol (Vitamin D-3) 50 MCG (2000 UT) tablet oral    fluticasone (Flonase) 50 mcg/actuation nasal spray 2 sprays, Each Nostril, Daily    fluticasone propion-salmeteroL (Advair Diskus) 250-50 mcg/dose diskus inhaler INHALE 1 DOSE BY MOUTH TWICE DAILY. RINSE AND GARGLE MOUTH WITH WATER AFTER EACH USE    lactase 3,000 unit tablet,chewable oral    multivitamin tablet oral    omeprazole (PriLOSEC) 20 mg DR capsule TAKE 1 CAPSULE BY MOUTH ONCE DAILY IN THE MORNING BEFORE BREAKFAST    rosuvastatin (CRESTOR) 10 mg, oral, Daily    simvastatin (ZOCOR) 20 mg, oral, Daily    sodium chloride (Ayr Saline) 0.65 % nasal spray nasal, 4 times daily    venlafaxine XR (Effexor-XR) 150 mg 24 hr capsule Take 1 capsule by mouth once daily    vit C,L-Qj-epsaj-lutein-zeaxan (PreserVision AREDS-2) 250-90-40-1 mg capsule oral        Objective     /69   Pulse 65   Temp 36.8 °C (98.2 °F) (Temporal)   Wt 77.1 kg (170 lb)   SpO2 96%   BMI 33.20 kg/m²     Physical Exam  General: Alert and oriented, in no apparent distress   HEENT: No conjunctival erythema, no external facial lesions   Lungs: Breathing comfortably  Skin: No evidence of skin breakdown.  Neuro: AAO x 3, answering questions appropriately, no obvious cranial nerve deficits   Back: reproducible paraspinal tenderness on the left, no spinous process tenderness, gait unremarkable. Strength full.   Assessment/Plan   Problem List Items Addressed This Visit       Moderate persistent asthma without complication     Now maintained on advair with significant improvement in symptoms, no recent exacerbations and has improved frequency of exacerbations. Uses flonase as well in the evening.          Malignant neoplasm of female breast, unspecified estrogen receptor status, unspecified laterality, unspecified site of  breast (CMS/HCC)     Maintained on anastrazole, s/p right partial mastectomy, upcoming appt scheduled with [facility] in consideration for discontinuation of anastrozole after 5 years of treatment.         Hypercholesterolemia     Stop simvastatin, start rosuvastatin 10mg         Relevant Medications    rosuvastatin (Crestor) 10 mg tablet    Macular degeneration - Primary     Followed by optho q6 months          Chronic left-sided low back pain without sciatica     Chronic longstanding back pain without alarm symptoms, has been treated with injections and PT in the past with some success, does home exercise  Refer to PT.   If persists or worsens, will pursue imaging          Relevant Orders    Referral to Physical Therapy     Health Maintenance  Cancer Screening:  - Colonoscopy declined in the past   - Mammogram   - Pap n/a   Immunizations: declines RSV     Interested in establishment of care with physician with more availability. Names provided.

## 2024-03-21 NOTE — PATIENT INSTRUCTIONS
Sunni,   Possible PCPs:   Corrine Hunt MD in [place] [phone]  Dragan Garcia MD in [place] [phone]  Mario William MD here in our office

## 2024-03-22 NOTE — ASSESSMENT & PLAN NOTE
Maintained on anastrazole, s/p right partial mastectomy, upcoming appt scheduled with Sustin in consideration for discontinuation of anastrozole after 5 years of treatment.

## 2024-03-22 NOTE — ASSESSMENT & PLAN NOTE
Now maintained on advair with significant improvement in symptoms, no recent exacerbations and has improved frequency of exacerbations. Uses flonase as well in the evening.

## 2024-03-27 ENCOUNTER — APPOINTMENT (OUTPATIENT)
Dept: HEMATOLOGY/ONCOLOGY | Facility: CLINIC | Age: 79
End: 2024-03-27
Payer: MEDICARE

## 2024-03-27 ENCOUNTER — APPOINTMENT (OUTPATIENT)
Dept: RADIOLOGY | Facility: CLINIC | Age: 79
End: 2024-03-27
Payer: MEDICARE

## 2024-03-28 ENCOUNTER — OFFICE VISIT (OUTPATIENT)
Dept: HEMATOLOGY/ONCOLOGY | Facility: CLINIC | Age: 79
End: 2024-03-28
Payer: MEDICARE

## 2024-03-28 ENCOUNTER — HOSPITAL ENCOUNTER (OUTPATIENT)
Dept: RADIOLOGY | Facility: CLINIC | Age: 79
Discharge: HOME | End: 2024-03-28
Payer: MEDICARE

## 2024-03-28 VITALS
DIASTOLIC BLOOD PRESSURE: 80 MMHG | WEIGHT: 169 LBS | HEART RATE: 68 BPM | SYSTOLIC BLOOD PRESSURE: 151 MMHG | BODY MASS INDEX: 33.01 KG/M2

## 2024-03-28 VITALS — WEIGHT: 169.97 LBS | HEIGHT: 60 IN | BODY MASS INDEX: 33.37 KG/M2

## 2024-03-28 DIAGNOSIS — Z79.811 ENCOUNTER FOR MONITORING AROMATASE INHIBITOR THERAPY: ICD-10-CM

## 2024-03-28 DIAGNOSIS — Z51.81 ENCOUNTER FOR MONITORING AROMATASE INHIBITOR THERAPY: ICD-10-CM

## 2024-03-28 DIAGNOSIS — M85.88 OSTEOPENIA OF LUMBAR SPINE: ICD-10-CM

## 2024-03-28 DIAGNOSIS — Z85.3 PERSONAL HISTORY OF BREAST CANCER: Primary | ICD-10-CM

## 2024-03-28 DIAGNOSIS — Z12.31 ENCOUNTER FOR SCREENING MAMMOGRAM FOR MALIGNANT NEOPLASM OF BREAST: ICD-10-CM

## 2024-03-28 PROCEDURE — 77067 SCR MAMMO BI INCL CAD: CPT | Mod: BILATERAL PROCEDURE | Performed by: STUDENT IN AN ORGANIZED HEALTH CARE EDUCATION/TRAINING PROGRAM

## 2024-03-28 PROCEDURE — 3077F SYST BP >= 140 MM HG: CPT | Performed by: NURSE PRACTITIONER

## 2024-03-28 PROCEDURE — 99213 OFFICE O/P EST LOW 20 MIN: CPT | Performed by: NURSE PRACTITIONER

## 2024-03-28 PROCEDURE — 1160F RVW MEDS BY RX/DR IN RCRD: CPT | Performed by: NURSE PRACTITIONER

## 2024-03-28 PROCEDURE — 1159F MED LIST DOCD IN RCRD: CPT | Performed by: NURSE PRACTITIONER

## 2024-03-28 PROCEDURE — 77063 BREAST TOMOSYNTHESIS BI: CPT | Mod: BILATERAL PROCEDURE | Performed by: STUDENT IN AN ORGANIZED HEALTH CARE EDUCATION/TRAINING PROGRAM

## 2024-03-28 PROCEDURE — 1126F AMNT PAIN NOTED NONE PRSNT: CPT | Performed by: NURSE PRACTITIONER

## 2024-03-28 PROCEDURE — 77063 BREAST TOMOSYNTHESIS BI: CPT

## 2024-03-28 PROCEDURE — 3079F DIAST BP 80-89 MM HG: CPT | Performed by: NURSE PRACTITIONER

## 2024-03-28 PROCEDURE — 1157F ADVNC CARE PLAN IN RCRD: CPT | Performed by: NURSE PRACTITIONER

## 2024-03-28 RX ORDER — ANASTROZOLE 1 MG/1
1 TABLET ORAL DAILY
Qty: 30 TABLET | Refills: 3 | Status: SHIPPED | OUTPATIENT
Start: 2024-03-28

## 2024-03-28 ASSESSMENT — PAIN SCALES - GENERAL: PAINLEVEL: 0-NO PAIN

## 2024-03-28 NOTE — PROGRESS NOTES
Oncology Follow-Up    Sunni Pérez  [id number]              Breast         AJCC Edition: 8th (AJCC), Diagnosis Date: Jun 2019, IA, pT2 pN0 cM0 G1  Oncology History    No history exists.     Treatment History:    1. Abnormal screening mammogram on 4/17/2019 showed an area of focal asymmetry in the central right breast. Diagnostic views performed 5/9/2019 revealed developing  asymmetry in the superior right breast. There was no ultrasound correlate.   2. On 5/15/2019 she underwent a right breast core biopsy. Pathology revealed invasive mammary carcinoma, grade 1. ER and RI both > 95%. HER-2/shannan was 0 on IHC.   3. On 6/14/2019 she underwent a right partial mastectomy with sentinel lymph node biopsy. Pathology revealed a 2.1 cm invasive mammary carcinoma, grade 1. 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 The resection margins were negative. 0/2 SLN.   4. Radiation consult completed and radiation not elected.  5. Anastrozole initiated 7/10/19.     Selma Morales presents for her Routine follow up visit.   She reports chronic back pain and will be starting PT next month.  Carmen rates her energy level as 5-6/10 and reports no distress.  She continues on anastrozole and asks for a refill.  Carmen denies any unusual headaches, balance issues, depression, cough, shortness of breath, problems swallowing, changes in chest/breast area, abdominal pain, bone or muscle pain (other then mentioned above), vaginal bleeding, rectal bleeding, blood in the urine, vaginal dryness, swelling arms or legs, new or unusual skin moles or lesions.       Objective      Vitals:    03/28/24 1511   BP: 151/80   Pulse: 68        Constitutional: Well developed, alert/oriented x3, no distress, cooperative   Eyes: clear sclera   ENMT: mucous membranes moist, no apparent lesions   Head/Neck: Neck supple, no bruits   Respiratory/Thorax: Patent airways, normal breath sounds  with good chest expansion   Cardiovascular: Regular rate and rhythm, no murmurs, 2+ equal pulses of the extremities,   Gastrointestinal: Nondistended, soft, non-tender, no masses palpable, no organomegaly   Musculoskeletal: ROM intact, no joint swelling, normal strength   Extremities: normal extremities, no edema, cyanosis, contusions or wounds   Neurological: alert and oriented x3,  normal strength   Breast:     Lymphatic: No significant lymphadenopathy   Psychological: Appropriate mood and behavior   Skin: Warm and dry, no lesions, no rashes      Physical Exam  Chest:          Comments: Right breast + for breast conserving surgery with well healed central/superior and right axillary incisions; no masses, nodules, skin changes, discharge. Left breast without masses, nodules, skin changes, discharge.          Lab Results   Component Value Date    WBC 8.2 04/25/2023    HGB 12.0 04/25/2023    HCT 38.1 04/25/2023    MCV 86 04/25/2023     04/25/2023       Chemistry    Lab Results   Component Value Date/Time     04/25/2023 1315    K 4.0 04/25/2023 1315     (H) 04/25/2023 1315    CO2 26 04/25/2023 1315    BUN 22 04/25/2023 1315    CREATININE 0.87 04/25/2023 1315    Lab Results   Component Value Date/Time    CALCIUM 10.0 04/25/2023 1315    ALKPHOS 124 04/25/2023 1315    AST 22 04/25/2023 1315    ALT 22 04/25/2023 1315    BILITOT 0.5 04/25/2023 1315         Imaging:       Height: 60.0 in.  Weight: 174.0 lbs.  Fractures:    Treatments:    Clinical Data: Carcinoma of central portion of right female breast,   Encounter for monitoring aramatase inhibitor, Osteopenia  CLINICAL INFORMATION:Evaluate for osteopenia/osteoporosis  FINDINGS: Standard measurements were obtained utilizing a Dual Energy   X-ray Absorptiometry bone densitometer. Data obtained includes planar   bone density measurements over the Left Femur and Lumbar Spine.   Comparison of measured data and standardized mean data for a young   adult  population (when peak bone mass occurs) results in a T score.   This represents the number of standard deviations above or below the   mean of a young adult population. Comparison of measured data to   standards from an age adjusted population similarly yields a Z score.      Left Femur Neck :    . Bone Density: 0.754 g/cm2 . . T Score: -2.0 . . Z Score: -0.4 . .   WHO Classification: Osteopenia . . % Change: -6.6% *     Left Femur Total :    . Bone Density: 0.876 g/cm2 . . T Score: -1.0 . . Z Score: 0.4 . .   WHO Classification: Normal . . % Change: 0.9%*     AP Spine L1-L4 :   . Bone Density: 1.149 g/cm2 . . T Score: -0.3 . . Z Score: 1.1 . .   WHO Classification: Normal . . % Change: 15.2% *  .  World Health Organization (WHO) criteria defines normal bone density   as that which is less than 1 standard deviation (S.D.) below the mean   of a young adult population. Osteopenia is defined as a measured bone   density that is between 1 and 2.5 S.D. below the mean of a young   adult population. Osteoporosis is defined as a measured bone density   that is greater than or equal to 2.5 S.D. below the mean of a young   adult population. The WHO reference diagnosis is based on   postmenopausal women and men age 50 and over.  .  IMPRESSION:  The BMD measured at Femur Neck is 0.754 g/cm2 with a T-score of -2.0.    This patient is considered to have low bone mass according to World   Health Organization (WHO) criteria.  Bone density is between 10 and   25% below young normal.  Treatment is advised.     With a Z-score of -0.4, this patient's BMD is considered within   normal limits relative to their age.  Even so, they may be considered   osteopenic or osteoporotic, which is normal for this age.  .  Bone mineral density in the lumbar spine may be falsely elevated   secondary to discogenic degenerative disease and degenerative facet   sclerosis.     Assessment/Plan    Carmen is a 77 yo woman with a history of T2N0 invasive  mammary carcinoma. She is s/p partial mastectomy, XRT, and is currently on anastrozole with good tolerance. There is no evidence of recurrent disease on today's exam.  Plan:  Exam and mammogram are negative.  Continue anastrozole 1mg daily through July and then discontinue. She will have completed 5 years of endocrine therapy.  See Dr. Larsen and Dr. Xiong at least yearly in primary care and gynecology.  Encouraged monthly breast self exams, plant based diet, keep alcohol <3 drinks/week, exercise at least 2.5 hours/week.  We reviewed signs/symptoms of recurrence including new masses, new pigmented lesion, tugging or pulling of the skin, nipple discharge, rash in or around the chest area, or any new finding that doesn't resolve within a 2-3 weeks.  All of Carmen's questions/concerns were addressed.  Over 25 minutes of time was spent with this patient with >50% of the time with education, counseling, and coordination of care.   I will see Carmen back in one year.     Diagnoses and all orders for this visit:  Personal history of breast cancer  -     Clinic Appointment Request Follow Up; KARIME ESCALERA; Future  -     BI mammo bilateral screening tomosynthesis; Future  -     anastrozole (Arimidex) 1 mg tablet; Take 1 tablet (1 mg total) by mouth once daily.  Swallow whole with a drink of water.  Encounter for monitoring aromatase inhibitor therapy  -     Clinic Appointment Request Follow Up; KARIME ESCALERA; Future  -     BI mammo bilateral screening tomosynthesis; Future  -     anastrozole (Arimidex) 1 mg tablet; Take 1 tablet (1 mg total) by mouth once daily.  Swallow whole with a drink of water.  Osteopenia of lumbar spine          Karime Escalera, FRANCIA-CNP

## 2024-04-01 PROBLEM — Z85.3 PERSONAL HISTORY OF BREAST CANCER: Status: ACTIVE | Noted: 2024-04-01

## 2024-04-01 PROBLEM — Z79.811 ENCOUNTER FOR MONITORING AROMATASE INHIBITOR THERAPY: Status: ACTIVE | Noted: 2024-04-01

## 2024-04-01 PROBLEM — Z51.81 ENCOUNTER FOR MONITORING AROMATASE INHIBITOR THERAPY: Status: ACTIVE | Noted: 2024-04-01

## 2024-04-01 NOTE — PATIENT INSTRUCTIONS
1. Exercise 2.5 hours per week; bone strengthening, cardio-vascular, resistance training.  2. Please do self breast exams monthly.  3. Keep alcohol under 3 drinks per week.  4. Sun safety - limit sun exposure from 11a-2p when its at its hottest, apply 15-30 sun block and re-apply every 1-2 hours if perspiring or swimming.  5. Eat a plant based diet, add in oily fishes such as mackerel, tuna, and salmon.  6. Get in at least 1,000 mg of calcium per day through diet or supplement for bone strength. Examples of foods higher in calcium are milk, yogurt, fruited yogurt, oranges, fortified orange juice, almonds, almond milk, broccoli, spinach, bok fide, mustard greens, puddings, custards, ice cream, fortified cereals, bars, and crackers.   7. Continue anastrozole 1mg daily through July and then discontinue. Congratulations on completing therapy!  8. Please call the office if any new mass or rash in or around breast, or any uncontrolled symptoms that last over 2-3 weeks at [phone].  9. Your exam and mammogram are negative!  10. It was nice seeing you today, Carmen. I will see you back in one year with your mammogram. Please call with any concerns.  Have a nice spring and summer!  Thank you for choosing [facility] for your care.

## 2024-04-12 ENCOUNTER — EVALUATION (OUTPATIENT)
Dept: PHYSICAL THERAPY | Facility: CLINIC | Age: 79
End: 2024-04-12
Payer: MEDICARE

## 2024-04-12 DIAGNOSIS — M54.50 CHRONIC LEFT-SIDED LOW BACK PAIN WITHOUT SCIATICA: ICD-10-CM

## 2024-04-12 DIAGNOSIS — G89.29 CHRONIC LEFT-SIDED LOW BACK PAIN WITHOUT SCIATICA: ICD-10-CM

## 2024-04-12 PROCEDURE — 97161 PT EVAL LOW COMPLEX 20 MIN: CPT | Mod: GP | Performed by: PHYSICAL THERAPIST

## 2024-04-12 ASSESSMENT — ENCOUNTER SYMPTOMS
LOSS OF SENSATION IN FEET: 0
DEPRESSION: 0
OCCASIONAL FEELINGS OF UNSTEADINESS: 0

## 2024-04-12 ASSESSMENT — COLUMBIA-SUICIDE SEVERITY RATING SCALE - C-SSRS
2. HAVE YOU ACTUALLY HAD ANY THOUGHTS OF KILLING YOURSELF?: NO
6. HAVE YOU EVER DONE ANYTHING, STARTED TO DO ANYTHING, OR PREPARED TO DO ANYTHING TO END YOUR LIFE?: NO
1. IN THE PAST MONTH, HAVE YOU WISHED YOU WERE DEAD OR WISHED YOU COULD GO TO SLEEP AND NOT WAKE UP?: NO

## 2024-04-12 NOTE — PROGRESS NOTES
Physical Therapy Evaluation    Patient Name: Sunni Pérez  MRN: [id number]  Today's Date: 4/12/2024  Visit: 1/MN  Referred by:   Diagnosis:   1. Chronic left-sided low back pain without sciatica  Referral to Physical Therapy          PRECAUTIONS:   Asthmatic bronchitis.    SUBJECTIVE:  Patient with complaints of diffuse lower back pain. This has been an on/off issues since 1980. She injured her back lifting something that was too heavy. Previous bouts of sciatica but nothing more recently. Does a HEP four roughly 20 minutes each morning. Previous treatments have included injections, therapy, chiropractic care, pain management. Believes a flare-up of asthmatic bronchitis could have worsened her lower back pain.   Current daily regiment: LTR, pelvic tilts, seated piriformis stretch, modified crunches, SKTC stretches  Pain:  0-9/10 (has not felt significant pain in at least a few weeks)  Currently experiencing tiredness/fatigue un the lower back - happens with prolonged standing.  Home Living:  No concerns  Prior level of function:  History of LBP but independent with all ADLS.    OBJECTIVE:  Lumbar AROM: (% movement)   Flexion >75%   Extension >75%   Right Sidebend >75%   Left Sidebend >75%   Right Rotation >75%   Left Rotation >75%     No change of symptoms with sustained or repeated motions.  Flexibility:  (B) hip flexor tightness - mild/moderate  Core Strength: Sahrmann level 4  Palpation: Mild increase in tone (B) lumbar paraspinals.  Special Testing: No positive testing  Dermatomal Impairment:  No deficit  Myotomal Impairment: No deficit, BLE 4+/5 throughout  Gait: (B) toe out, no assistive device used.      ASSESSMENT:  Patient is a 78 year old female who presents to therapy on this date for evaluation of their lumbar spine pain. Examination on this date reveals the very little deficits. Her lumbar pain has been on/off for many years and today, the pain is nearly nonexistent. Some mild hip flexor tightness may  "contribute to increased stress in the lumbar spine. Skilled physical therapy will address these deficits to help reduce pain for return to prior level of function.      TREATMENT:  PATIENT EDUCATION:  Outpatient Education  Individual(s) Educated: Patient  Education Provided: Anatomy, Home Exercise Program, Physiology, POC  Patient/Caregiver Demonstrated Understanding: yes  Plan of Care Discussed and Agreed Upon: yes  Patient Response to Education: Patient/Caregiver Verbalized Understanding of Information, Patient/Caregiver Performed Return Demonstration of Exercises/Activities, Patient/Caregiver Asked Appropriate Questions  Access Code: [email or web address]  URL: [email or web address]  Date: 04/12/2024  Prepared by: Mane Romero    Exercises  - Modified Jemal Stretch  - 1 x daily - 7 x weekly - 1 sets - 5 reps - 30 sec hold  - Hip Flexor Stretch with Chair  - 1 x daily - 7 x weekly - 1 sets - 5 reps - 30 sec hold  - Seated Flexion Stretch  - 1 x daily - 7 x weekly - 1 sets - 10 reps - 10 sec hold    PLAN:   Treatment/Interventions: Education/ Instruction, Self care/ home management, Therapeutic exercises  PT Plan: Skilled PT  PT Frequency: Other (Comment) (\"as-needed basis\")  Onset Date: 03/21/24  Certification Period Start Date: 04/12/24  Certification Period End Date: 07/11/24  Rehab Potential: Excellent  Plan of Care Agreement: Patient      GOALS:  Active       PT Problem       Patient to exhibit Sahrmann core strength of >4       Start:  04/12/24    Expected End:  06/07/24            Patient will achieve bilateral hip abduction strength of 4+/5       Start:  04/12/24    Expected End:  06/07/24            Patient will achieve bilateral hip external rotation strength of 4+/5 in neutral       Start:  04/12/24    Expected End:  06/07/24            Patient will demonstrate independence in home program for support of progression       Start:  04/12/24    Expected End:  06/07/24            Patient will " report a 2 point reduction in pain while performing ADLs including prolonged walking.       Start:  04/12/24    Expected End:  06/07/24

## 2024-04-16 DIAGNOSIS — J45.40 MODERATE PERSISTENT ASTHMA WITHOUT COMPLICATION (HHS-HCC): ICD-10-CM

## 2024-04-16 RX ORDER — FLUTICASONE PROPIONATE AND SALMETEROL 250; 50 UG/1; UG/1
POWDER RESPIRATORY (INHALATION)
Qty: 60 EACH | Refills: 0 | Status: SHIPPED | OUTPATIENT
Start: 2024-04-16 | End: 2024-05-21

## 2024-05-17 DIAGNOSIS — J45.40 MODERATE PERSISTENT ASTHMA WITHOUT COMPLICATION (HHS-HCC): ICD-10-CM

## 2024-05-21 ENCOUNTER — APPOINTMENT (OUTPATIENT)
Dept: OBSTETRICS AND GYNECOLOGY | Facility: CLINIC | Age: 79
End: 2024-05-21
Payer: MEDICARE

## 2024-05-21 RX ORDER — FLUTICASONE PROPIONATE AND SALMETEROL 250; 50 UG/1; UG/1
POWDER RESPIRATORY (INHALATION)
Qty: 60 EACH | Refills: 0 | Status: SHIPPED | OUTPATIENT
Start: 2024-05-21

## 2024-06-12 ENCOUNTER — APPOINTMENT (OUTPATIENT)
Dept: PRIMARY CARE | Facility: CLINIC | Age: 79
End: 2024-06-12
Payer: MEDICARE

## 2024-06-12 VITALS
HEART RATE: 65 BPM | DIASTOLIC BLOOD PRESSURE: 63 MMHG | WEIGHT: 171 LBS | SYSTOLIC BLOOD PRESSURE: 110 MMHG | BODY MASS INDEX: 33.4 KG/M2

## 2024-06-12 DIAGNOSIS — E78.00 HYPERCHOLESTEROLEMIA: Primary | ICD-10-CM

## 2024-06-12 DIAGNOSIS — M85.859 OSTEOPENIA OF HIP, UNSPECIFIED LATERALITY: ICD-10-CM

## 2024-06-12 DIAGNOSIS — I10 BENIGN ESSENTIAL HTN: ICD-10-CM

## 2024-06-12 DIAGNOSIS — J45.40 MODERATE PERSISTENT ASTHMA WITHOUT COMPLICATION (HHS-HCC): ICD-10-CM

## 2024-06-12 DIAGNOSIS — Z13.820 ENCOUNTER FOR OSTEOPOROSIS SCREENING IN ASYMPTOMATIC POSTMENOPAUSAL PATIENT: ICD-10-CM

## 2024-06-12 DIAGNOSIS — Z23 NEED FOR PNEUMOCOCCAL 20-VALENT CONJUGATE VACCINATION: ICD-10-CM

## 2024-06-12 DIAGNOSIS — Z78.0 ENCOUNTER FOR OSTEOPOROSIS SCREENING IN ASYMPTOMATIC POSTMENOPAUSAL PATIENT: ICD-10-CM

## 2024-06-12 DIAGNOSIS — E55.9 VITAMIN D DEFICIENCY: ICD-10-CM

## 2024-06-12 DIAGNOSIS — R73.02 IGT (IMPAIRED GLUCOSE TOLERANCE): ICD-10-CM

## 2024-06-12 DIAGNOSIS — K21.9 GASTROESOPHAGEAL REFLUX DISEASE WITHOUT ESOPHAGITIS: ICD-10-CM

## 2024-06-12 DIAGNOSIS — F41.8 DEPRESSION WITH ANXIETY: ICD-10-CM

## 2024-06-12 DIAGNOSIS — Z11.59 NEED FOR HEPATITIS C SCREENING TEST: ICD-10-CM

## 2024-06-12 PROBLEM — R73.03 PREDIABETES: Status: RESOLVED | Noted: 2023-04-25 | Resolved: 2024-06-12

## 2024-06-12 PROCEDURE — 3074F SYST BP LT 130 MM HG: CPT | Performed by: INTERNAL MEDICINE

## 2024-06-12 PROCEDURE — 1157F ADVNC CARE PLAN IN RCRD: CPT | Performed by: INTERNAL MEDICINE

## 2024-06-12 PROCEDURE — G0009 ADMIN PNEUMOCOCCAL VACCINE: HCPCS | Performed by: INTERNAL MEDICINE

## 2024-06-12 PROCEDURE — 1160F RVW MEDS BY RX/DR IN RCRD: CPT | Performed by: INTERNAL MEDICINE

## 2024-06-12 PROCEDURE — 3078F DIAST BP <80 MM HG: CPT | Performed by: INTERNAL MEDICINE

## 2024-06-12 PROCEDURE — 1036F TOBACCO NON-USER: CPT | Performed by: INTERNAL MEDICINE

## 2024-06-12 PROCEDURE — 1159F MED LIST DOCD IN RCRD: CPT | Performed by: INTERNAL MEDICINE

## 2024-06-12 PROCEDURE — 99215 OFFICE O/P EST HI 40 MIN: CPT | Performed by: INTERNAL MEDICINE

## 2024-06-12 PROCEDURE — 90677 PCV20 VACCINE IM: CPT | Performed by: INTERNAL MEDICINE

## 2024-06-12 RX ORDER — ROSUVASTATIN CALCIUM 10 MG/1
10 TABLET, COATED ORAL DAILY
Qty: 100 TABLET | Refills: 3 | Status: SHIPPED | OUTPATIENT
Start: 2024-06-12 | End: 2025-07-17

## 2024-06-12 RX ORDER — FLUTICASONE PROPIONATE AND SALMETEROL 250; 50 UG/1; UG/1
POWDER RESPIRATORY (INHALATION)
Qty: 60 EACH | Refills: 5 | Status: SHIPPED | OUTPATIENT
Start: 2024-06-12

## 2024-06-12 RX ORDER — AMLODIPINE BESYLATE 5 MG/1
5 TABLET ORAL DAILY
Qty: 90 TABLET | Refills: 3 | Status: SHIPPED | OUTPATIENT
Start: 2024-06-12

## 2024-06-12 RX ORDER — OMEPRAZOLE 20 MG/1
20 CAPSULE, DELAYED RELEASE ORAL
Qty: 90 CAPSULE | Refills: 3 | Status: SHIPPED | OUTPATIENT
Start: 2024-06-12

## 2024-06-12 RX ORDER — VENLAFAXINE HYDROCHLORIDE 150 MG/1
150 CAPSULE, EXTENDED RELEASE ORAL DAILY
Qty: 90 CAPSULE | Refills: 3 | Status: SHIPPED | OUTPATIENT
Start: 2024-06-12

## 2024-06-12 RX ORDER — FLUTICASONE PROPIONATE 50 MCG
2 SPRAY, SUSPENSION (ML) NASAL DAILY
Qty: 48 G | Refills: 3 | Status: SHIPPED | OUTPATIENT
Start: 2024-06-12

## 2024-06-12 ASSESSMENT — ENCOUNTER SYMPTOMS
SHORTNESS OF BREATH: 0
CHILLS: 0
POLYDIPSIA: 0
FEVER: 0
COUGH: 0
PALPITATIONS: 0

## 2024-06-12 NOTE — PROGRESS NOTES
Subjective   Patient ID: Sunni Pérez is a 78 y.o. female who presents for Establish Care (TC FROM DR. DICK).    PMHx:  - Invasive mammary carcinoma, ductal carcinoma in situ status post right partial mastectomy and sentinel lymph node biopsy with negative margins on June 2019, opted not to pursue radiation, on anastrozole, last seen by heme-onc in July 2022 recommended to see dermatology twice a year, followed by Arianne Sutherland has had recent mammogram which showed stable findings recommended one year followup upcoming visit scheduled. May be coming off anastrozole.   - Asthma - moderate persistent - on advair daily and prn, recommended for PFTs. She gets frequent asthmatic bronchitis episodes. She had another episode a few months ago where she took flonase which helped her symptoms as well as ayr. There is no prn use of advair.   - Macular degeneration - followed by optho q6 months   - HTN -  on amlodipine 5mg history of hypertensive emergency post covid vaccination  - Depression and anxiety on venlafaxine- working well for her.   - SCC - left lower extremity followed by dermatology [id number] Dr. Tania Irvin.   - HLD - simvastatin 40 + 20mg - persistent hypertriglyceridemia   - Vitamin D deficiency on 6000 units   - Osteopenia - last bone density 1/22. on vitamin D   - GERD - on omeprazole 20mg longstanding   - LBP chronic inability to exercise   - Prediabetes - with metabolic syndrome     Advised for vaccines available, RSV specifically. Advised on the Pneumonia booster.  78-year-old female presents today to establish for care with a new provider.  She was previously under the management of one of my partners physicians but due to their part-time schedule it did not particularly work for her preferences and needs and she was advised to consider reestablishing with myself who is available full-time.  At this time there were no acute issues that need to be addressed but multiple chronic health issues that need  to be reviewed and updates that needed to be performed or considered.  I performed a detailed review of the patient's chart medical background and history and confirmed her medical record at this time updating any erroneous or out of date records.  Her medication list is corrected and up-to-date at this time.  Medications reviewed discussed and refilled in my name to her preferred pharmacy.  I reviewed her laboratory testing from the past and updated laboratory testing with additional blood work at this time based on her chronic and historical medical concerns.  She has a history of breast cancer currently finishing 5 years of anastrozole with discontinuation happening later this year.  She has completed her mammogram for this year in March 2024 with no abnormal findings.  Her last bone density was in 2022, she is due for 2-year follow-up, previous osteopenia of the hip was noted on that examination had a T-score of -2.0.  Patient counseled in detail regarding this finding and my recommendations for follow-up bone density screening.  Her major depressive disorder is currently well-controlled her medication has not been changed in a number of years and it does what she needed to to maintain stability with her depression symptoms.  She does admit to increased stressors in her life because of her 's Parkinson's and her daughters negative biopsy and surgery for the possibility of adrenal gland tumor.  But despite the stressors she is managing and coping well with the assistance of her medication.  She was educated in detail today about outstanding vaccinations including shingles vaccine, RSV vaccine, and pneumonia vaccine.  At this time she is declining the shingles and RSV vaccine.  She is willing to do the pneumonia vaccine booster that is required and will receive that today.  She has no interest in receiving a future dose of the COVID-19 vaccine as she had a unfortunate reaction to previous dosing and it is  listed on her allergy chart will not be performed further.  She was advised presents for follow-up, prednisone on an as-needed basis.  She has a history of chronic respiratory symptoms which are currently clinically stable with her Advair medication at this dose.  She is not having any exacerbations wheezing or symptoms of concern at this time.      Total time of encounter including previsit chart review and postvisit documentation as well as direct face-to-face time with patient 48 minutes.             Review of Systems   Constitutional:  Negative for chills and fever.   Respiratory:  Negative for cough and shortness of breath.    Cardiovascular:  Negative for chest pain and palpitations.   Endocrine: Negative for polydipsia and polyuria.       Objective   /63 (BP Location: Left arm, Patient Position: Sitting, BP Cuff Size: Large adult)   Pulse 65   Wt 77.6 kg (171 lb)   BMI 33.40 kg/m²     Physical Exam  Constitutional:       Appearance: Normal appearance.   HENT:      Head: Normocephalic and atraumatic.   Eyes:      Extraocular Movements: Extraocular movements intact.      Pupils: Pupils are equal, round, and reactive to light.   Neck:      Thyroid: No thyroid mass or thyromegaly.      Vascular: No carotid bruit.   Cardiovascular:      Rate and Rhythm: Normal rate and regular rhythm.      Heart sounds: No murmur heard.     No friction rub. No gallop.   Pulmonary:      Effort: No respiratory distress.      Breath sounds: No wheezing, rhonchi or rales.   Musculoskeletal:      Cervical back: Neck supple.      Right lower leg: No edema.      Left lower leg: No edema.   Lymphadenopathy:      Cervical: No cervical adenopathy.   Neurological:      Mental Status: She is alert.       Assessment/Plan   Problem List Items Addressed This Visit             ICD-10-CM    Depression with anxiety F41.8    Relevant Medications    venlafaxine XR (Effexor-XR) 150 mg 24 hr capsule    Moderate persistent asthma without  complication ([facility]) J45.40    Relevant Medications    fluticasone propion-salmeteroL (Advair Diskus) 250-50 mcg/dose diskus inhaler    fluticasone (Flonase) 50 mcg/actuation nasal spray    Benign essential HTN I10    Relevant Medications    amLODIPine (Norvasc) 5 mg tablet    Other Relevant Orders    Vitamin D 25-Hydroxy,Total (for eval of Vitamin D levels)    Lipid Panel    CBC    Comprehensive Metabolic Panel    Hemoglobin A1C    Hypercholesterolemia - Primary E78.00    Relevant Medications    rosuvastatin (Crestor) 10 mg tablet    Other Relevant Orders    Vitamin D 25-Hydroxy,Total (for eval of Vitamin D levels)    Lipid Panel    CBC    Comprehensive Metabolic Panel    Hemoglobin A1C    Osteopenia M85.80    Relevant Orders    XR DEXA bone density    Gastroesophageal reflux disease without esophagitis K21.9    Relevant Medications    omeprazole (PriLOSEC) 20 mg DR capsule    IGT (impaired glucose tolerance) R73.02    Relevant Orders    Vitamin D 25-Hydroxy,Total (for eval of Vitamin D levels)    Lipid Panel    CBC    Comprehensive Metabolic Panel    Hemoglobin A1C     Other Visit Diagnoses         Codes    Vitamin D deficiency     E55.9    Relevant Orders    Vitamin D 25-Hydroxy,Total (for eval of Vitamin D levels)    Lipid Panel    CBC    Comprehensive Metabolic Panel    Hemoglobin A1C    XR DEXA bone density    Need for hepatitis C screening test     Z11.59    Relevant Orders    Hepatitis C antibody    Need for pneumococcal 20-valent conjugate vaccination     Z23    Relevant Orders    Pneumococcal conjugate vaccine, 20-valent (PREVNAR 20) (Completed)    Encounter for osteoporosis screening in asymptomatic postmenopausal patient     Z13.820, Z78.0    Relevant Orders    XR DEXA bone density

## 2024-06-28 ENCOUNTER — LAB (OUTPATIENT)
Dept: LAB | Facility: LAB | Age: 79
End: 2024-06-28
Payer: MEDICARE

## 2024-06-28 DIAGNOSIS — I10 BENIGN ESSENTIAL HTN: ICD-10-CM

## 2024-06-28 DIAGNOSIS — E78.00 HYPERCHOLESTEROLEMIA: ICD-10-CM

## 2024-06-28 DIAGNOSIS — E55.9 VITAMIN D DEFICIENCY: ICD-10-CM

## 2024-06-28 DIAGNOSIS — R73.02 IGT (IMPAIRED GLUCOSE TOLERANCE): ICD-10-CM

## 2024-06-28 DIAGNOSIS — Z11.59 NEED FOR HEPATITIS C SCREENING TEST: ICD-10-CM

## 2024-06-28 LAB
25(OH)D3 SERPL-MCNC: 45 NG/ML (ref 30–100)
ALBUMIN SERPL BCP-MCNC: 4.5 G/DL (ref 3.4–5)
ALP SERPL-CCNC: 108 U/L (ref 33–136)
ALT SERPL W P-5'-P-CCNC: 20 U/L (ref 7–45)
ANION GAP SERPL CALC-SCNC: 16 MMOL/L (ref 10–20)
AST SERPL W P-5'-P-CCNC: 20 U/L (ref 9–39)
BILIRUB SERPL-MCNC: 0.6 MG/DL (ref 0–1.2)
BUN SERPL-MCNC: 20 MG/DL (ref 6–23)
CALCIUM SERPL-MCNC: 9.7 MG/DL (ref 8.6–10.6)
CHLORIDE SERPL-SCNC: 104 MMOL/L (ref 98–107)
CHOLEST SERPL-MCNC: 181 MG/DL (ref 0–199)
CHOLESTEROL/HDL RATIO: 4.1
CO2 SERPL-SCNC: 26 MMOL/L (ref 21–32)
CREAT SERPL-MCNC: 0.96 MG/DL (ref 0.5–1.05)
EGFRCR SERPLBLD CKD-EPI 2021: 61 ML/MIN/1.73M*2
ERYTHROCYTE [DISTWIDTH] IN BLOOD BY AUTOMATED COUNT: 13.7 % (ref 11.5–14.5)
EST. AVERAGE GLUCOSE BLD GHB EST-MCNC: 123 MG/DL
GLUCOSE SERPL-MCNC: 107 MG/DL (ref 74–99)
HBA1C MFR BLD: 5.9 %
HCT VFR BLD AUTO: 38.4 % (ref 36–46)
HCV AB SER QL: NONREACTIVE
HDLC SERPL-MCNC: 44.2 MG/DL
HGB BLD-MCNC: 12.3 G/DL (ref 12–16)
LDLC SERPL CALC-MCNC: 79 MG/DL
MCH RBC QN AUTO: 27.3 PG (ref 26–34)
MCHC RBC AUTO-ENTMCNC: 32 G/DL (ref 32–36)
MCV RBC AUTO: 85 FL (ref 80–100)
NON HDL CHOLESTEROL: 137 MG/DL (ref 0–149)
NRBC BLD-RTO: 0 /100 WBCS (ref 0–0)
PLATELET # BLD AUTO: 250 X10*3/UL (ref 150–450)
POTASSIUM SERPL-SCNC: 4 MMOL/L (ref 3.5–5.3)
PROT SERPL-MCNC: 7.1 G/DL (ref 6.4–8.2)
RBC # BLD AUTO: 4.5 X10*6/UL (ref 4–5.2)
SODIUM SERPL-SCNC: 142 MMOL/L (ref 136–145)
TRIGL SERPL-MCNC: 290 MG/DL (ref 0–149)
VLDL: 58 MG/DL (ref 0–40)
WBC # BLD AUTO: 7.3 X10*3/UL (ref 4.4–11.3)

## 2024-06-28 PROCEDURE — 80061 LIPID PANEL: CPT

## 2024-06-28 PROCEDURE — 85027 COMPLETE CBC AUTOMATED: CPT

## 2024-06-28 PROCEDURE — 80053 COMPREHEN METABOLIC PANEL: CPT

## 2024-06-28 PROCEDURE — 83036 HEMOGLOBIN GLYCOSYLATED A1C: CPT

## 2024-06-28 PROCEDURE — 82306 VITAMIN D 25 HYDROXY: CPT

## 2024-06-28 PROCEDURE — 36415 COLL VENOUS BLD VENIPUNCTURE: CPT

## 2024-06-28 PROCEDURE — 86803 HEPATITIS C AB TEST: CPT

## 2024-07-25 ENCOUNTER — TELEPHONE (OUTPATIENT)
Dept: PRIMARY CARE | Facility: CLINIC | Age: 79
End: 2024-07-25
Payer: MEDICARE

## 2024-07-25 DIAGNOSIS — R42 VERTIGO: Primary | ICD-10-CM

## 2024-07-25 RX ORDER — MECLIZINE HCL 12.5 MG 12.5 MG/1
12.5 TABLET ORAL EVERY 8 HOURS PRN
Qty: 30 TABLET | Refills: 0 | Status: SHIPPED | OUTPATIENT
Start: 2024-07-25 | End: 2025-07-25

## 2024-07-25 NOTE — TELEPHONE ENCOUNTER
Patient tested positive for COVID Sunday and has been extreme dizziness to the point that she can't lay down. She wanted to know if there was some she could take? Whatever you would suggest she would like to make sure it doesn't interfere with her Advair.

## 2024-07-25 NOTE — TELEPHONE ENCOUNTER
A prescription is sent in but if the patient is having severe vertigo after COVID, depending on how persistent and constant it is it does give me some concern towards the possibility of a vascular concern involving the cerebellum.  COVID is still associated with a low risk of increased hypercoagulability or clotting and that may be contributing.  I certainly do not know that it definitely is in just a concern that it may be possible.  The episodes are in nature and just happen when she changes positions the chances are very minimal.  But if it is constant very strong very persistent and happens and is persistent most of the day we would be concerned about that possibility and additional testing may be needed.

## 2024-08-06 ENCOUNTER — APPOINTMENT (OUTPATIENT)
Dept: OBSTETRICS AND GYNECOLOGY | Facility: CLINIC | Age: 79
End: 2024-08-06
Payer: MEDICARE

## 2024-08-06 ENCOUNTER — DOCUMENTATION (OUTPATIENT)
Dept: PHYSICAL THERAPY | Facility: CLINIC | Age: 79
End: 2024-08-06

## 2024-08-06 NOTE — PROGRESS NOTES
"Physical Therapy    Discharge Summary    Name: Sunni Pérez  MRN: [id number]  : 1945  Date: 24    Discharge Summary: PT    Discharge Information: Date of discharge 2024, Date of evaluation 2024, and Number of attended visits 1    Therapy Summary: Patient was to be treated on an \"as-needed basis\". Patient has not returned to therapy and thus, DC is appropriate.    Discharge Status: Unknown     Rehab Discharge Reason: Progress plateaued; further improvement possible  "

## 2024-08-13 ENCOUNTER — HOSPITAL ENCOUNTER (OUTPATIENT)
Dept: RADIOLOGY | Facility: CLINIC | Age: 79
Discharge: HOME | End: 2024-08-13
Payer: MEDICARE

## 2024-08-13 DIAGNOSIS — Z78.0 ENCOUNTER FOR OSTEOPOROSIS SCREENING IN ASYMPTOMATIC POSTMENOPAUSAL PATIENT: ICD-10-CM

## 2024-08-13 DIAGNOSIS — M85.859 OSTEOPENIA OF HIP, UNSPECIFIED LATERALITY: ICD-10-CM

## 2024-08-13 DIAGNOSIS — Z13.820 ENCOUNTER FOR OSTEOPOROSIS SCREENING IN ASYMPTOMATIC POSTMENOPAUSAL PATIENT: ICD-10-CM

## 2024-08-13 DIAGNOSIS — E55.9 VITAMIN D DEFICIENCY: ICD-10-CM

## 2024-08-13 PROCEDURE — 77080 DXA BONE DENSITY AXIAL: CPT

## 2024-08-13 PROCEDURE — 77080 DXA BONE DENSITY AXIAL: CPT | Performed by: RADIOLOGY

## 2024-08-13 ASSESSMENT — LIFESTYLE VARIABLES
3_OR_MORE_DRINKS_PER_DAY: N
CURRENT_SMOKER: N

## 2024-09-11 ENCOUNTER — APPOINTMENT (OUTPATIENT)
Dept: PRIMARY CARE | Facility: CLINIC | Age: 79
End: 2024-09-11
Payer: MEDICARE

## 2024-09-11 VITALS
TEMPERATURE: 97.9 F | DIASTOLIC BLOOD PRESSURE: 68 MMHG | SYSTOLIC BLOOD PRESSURE: 126 MMHG | HEIGHT: 60 IN | BODY MASS INDEX: 34.95 KG/M2 | HEART RATE: 71 BPM | WEIGHT: 178 LBS

## 2024-09-11 DIAGNOSIS — J45.40 MODERATE PERSISTENT ASTHMA WITHOUT COMPLICATION (HHS-HCC): ICD-10-CM

## 2024-09-11 DIAGNOSIS — E78.00 HYPERCHOLESTEROLEMIA: ICD-10-CM

## 2024-09-11 DIAGNOSIS — Z00.00 ROUTINE GENERAL MEDICAL EXAMINATION AT HEALTH CARE FACILITY: Primary | ICD-10-CM

## 2024-09-11 DIAGNOSIS — I10 BENIGN ESSENTIAL HTN: ICD-10-CM

## 2024-09-11 PROBLEM — J01.90 ACUTE SINUSITIS: Status: RESOLVED | Noted: 2024-02-28 | Resolved: 2024-09-11

## 2024-09-11 PROCEDURE — 1159F MED LIST DOCD IN RCRD: CPT | Performed by: INTERNAL MEDICINE

## 2024-09-11 PROCEDURE — 1036F TOBACCO NON-USER: CPT | Performed by: INTERNAL MEDICINE

## 2024-09-11 PROCEDURE — 3078F DIAST BP <80 MM HG: CPT | Performed by: INTERNAL MEDICINE

## 2024-09-11 PROCEDURE — 3074F SYST BP LT 130 MM HG: CPT | Performed by: INTERNAL MEDICINE

## 2024-09-11 PROCEDURE — 1170F FXNL STATUS ASSESSED: CPT | Performed by: INTERNAL MEDICINE

## 2024-09-11 PROCEDURE — 1160F RVW MEDS BY RX/DR IN RCRD: CPT | Performed by: INTERNAL MEDICINE

## 2024-09-11 PROCEDURE — G0439 PPPS, SUBSEQ VISIT: HCPCS | Performed by: INTERNAL MEDICINE

## 2024-09-11 PROCEDURE — 1126F AMNT PAIN NOTED NONE PRSNT: CPT | Performed by: INTERNAL MEDICINE

## 2024-09-11 PROCEDURE — 1157F ADVNC CARE PLAN IN RCRD: CPT | Performed by: INTERNAL MEDICINE

## 2024-09-11 RX ORDER — MULTIVITAMIN
1 TABLET ORAL DAILY
Start: 2024-09-11

## 2024-09-11 RX ORDER — ROSUVASTATIN CALCIUM 20 MG/1
20 TABLET, COATED ORAL DAILY
Qty: 100 TABLET | Refills: 3 | Status: SHIPPED | OUTPATIENT
Start: 2024-09-11 | End: 2025-10-16

## 2024-09-11 ASSESSMENT — ENCOUNTER SYMPTOMS
CHILLS: 0
COUGH: 0
PALPITATIONS: 0
POLYDIPSIA: 0
FEVER: 0
LOSS OF SENSATION IN FEET: 0
DEPRESSION: 0
SHORTNESS OF BREATH: 0
OCCASIONAL FEELINGS OF UNSTEADINESS: 0

## 2024-09-11 ASSESSMENT — ACTIVITIES OF DAILY LIVING (ADL)
TAKING_MEDICATION: INDEPENDENT
DOING_HOUSEWORK: INDEPENDENT
GROCERY_SHOPPING: INDEPENDENT
BATHING: INDEPENDENT
MANAGING_FINANCES: INDEPENDENT
DRESSING: INDEPENDENT

## 2024-09-11 ASSESSMENT — PATIENT HEALTH QUESTIONNAIRE - PHQ9
1. LITTLE INTEREST OR PLEASURE IN DOING THINGS: NOT AT ALL
1. LITTLE INTEREST OR PLEASURE IN DOING THINGS: NOT AT ALL
2. FEELING DOWN, DEPRESSED OR HOPELESS: NOT AT ALL
2. FEELING DOWN, DEPRESSED OR HOPELESS: NOT AT ALL
SUM OF ALL RESPONSES TO PHQ9 QUESTIONS 1 AND 2: 0
SUM OF ALL RESPONSES TO PHQ9 QUESTIONS 1 AND 2: 0

## 2024-09-11 ASSESSMENT — PAIN SCALES - GENERAL: PAINLEVEL: 0-NO PAIN

## 2024-09-11 NOTE — PROGRESS NOTES
Subjective   Reason for Visit: Sunni Pérez is an 78 y.o. female here for a Medicare Wellness visit.     Past Medical, Surgical, and Family History reviewed and updated in chart.    Reviewed all medications by prescribing practitioner or clinical pharmacist (such as prescriptions, OTCs, herbal therapies and supplements) and documented in the medical record.    Patient presents today for an annual wellness exam    Medical history and surgical history updated today  Medication list reconciled and updated  Patient denies vision issues at this time: follows with retina specialist  Patient denies hearing issues at this time  Follows with a dentist: Yes    Patient counseled about available preventative health vaccinations and provided with opportunity to update them with our office or through prescription to preferred pharmacy.    Reviewed and discussed preventative health screening recommendations for colon cancer: age completed    Reviewed and discussed preventative health recommendations for screening for cervical cancer and breast cancer     In addition to the physical patient has been dealing with intermittent symptoms of short-term vertigo diagnosis BPPV by ear nose and throat, she has been referred for vestibular therapy but missed her original appointment rescheduled and it is not available till October.  She was doing home exercises with little benefit.  I provided her with additional counseling today and new handout and advised her to watch the video on how to do this exercise properly at home to assure she is doing it correctly and has even minor flaws in the technique can result in no significant benefits.        Patient Care Team:  Mario William MD as PCP - General (Internal Medicine)  Carina Gillette DO as PCP - [employer] PCP     Review of Systems   Constitutional:  Negative for chills and fever.   Respiratory:  Negative for cough and shortness of breath.    Cardiovascular:  Negative  "for chest pain and palpitations.   Endocrine: Negative for polydipsia and polyuria.       Objective   Vitals:  /68 (BP Location: Left arm, Patient Position: Sitting, BP Cuff Size: Large adult)   Pulse 71   Temp 36.6 °C (97.9 °F) (Temporal)   Ht 1.53 m (5' 0.25\")   Wt 80.7 kg (178 lb)   BMI 34.48 kg/m²       Physical Exam  Constitutional:       Appearance: Normal appearance.   HENT:      Head: Normocephalic and atraumatic.      Right Ear: Tympanic membrane normal.      Left Ear: Tympanic membrane normal.      Nose: Nose normal.      Mouth/Throat:      Mouth: Mucous membranes are moist.   Eyes:      Extraocular Movements: Extraocular movements intact.      Conjunctiva/sclera: Conjunctivae normal.      Pupils: Pupils are equal, round, and reactive to light.   Neck:      Thyroid: No thyroid mass, thyromegaly or thyroid tenderness.      Vascular: No carotid bruit.   Cardiovascular:      Rate and Rhythm: Normal rate and regular rhythm.      Heart sounds: No murmur heard.     No friction rub. No gallop.   Pulmonary:      Effort: Pulmonary effort is normal. No respiratory distress.      Breath sounds: No wheezing, rhonchi or rales.   Abdominal:      General: Bowel sounds are normal.      Palpations: Abdomen is soft.      Tenderness: There is no abdominal tenderness. There is no guarding.      Hernia: No hernia is present.   Musculoskeletal:      Cervical back: Neck supple. No tenderness.      Right lower leg: No edema.      Left lower leg: No edema.   Lymphadenopathy:      Cervical: No cervical adenopathy.   Skin:     Coloration: Skin is not jaundiced.   Neurological:      General: No focal deficit present.      Mental Status: She is alert and oriented to person, place, and time.   Psychiatric:         Mood and Affect: Mood normal.         Assessment & Plan  Routine general medical examination at health care facility    Orders:    multivitamin tablet; Take 1 tablet by mouth once daily.    Benign essential HTN     "     Moderate persistent asthma without complication ([facility])         Hypercholesterolemia    Orders:    rosuvastatin (Crestor) 20 mg tablet; Take 1 tablet (20 mg) by mouth once daily.           Obesity Counseling  10 minutes were spent counseling on diet and exercise interventions to address obesity and weight reduction.

## 2024-09-13 ENCOUNTER — TELEPHONE (OUTPATIENT)
Dept: PRIMARY CARE | Facility: CLINIC | Age: 79
End: 2024-09-13

## 2024-09-13 DIAGNOSIS — J45.40 MODERATE PERSISTENT ASTHMA WITHOUT COMPLICATION (HHS-HCC): ICD-10-CM

## 2024-09-13 RX ORDER — FLUTICASONE PROPIONATE 50 MCG
2 SPRAY, SUSPENSION (ML) NASAL DAILY
Qty: 48 G | Refills: 3 | Status: SHIPPED | OUTPATIENT
Start: 2024-09-13

## 2024-09-13 NOTE — TELEPHONE ENCOUNTER
Valerie Valerio is a 54 year old female presents today for   Chief Complaint   Patient presents with   • Derm Problem     Mass/lump/abscess?     Valerie Valerio is a 54 year old female presenting with patient verbalizes she has a boil on her inner right thigh. Patient had a small one about a week ago and now it has worsened since Tuesday. Notes redness, purple, and swelling. Patient has difficulties sitting or laying on it. Endorses using hot compresses, iodine, triple abx ointment, and alcohol with minimal relief. Currently rating pain as excruciating if it rubs together and notes \"a zinger\" out of nowhere. Writer notes about a 2.5X1.5cm lump with a reddened and purple inflammation ring around it.    Denies Latex allergy or sensitivity    Medications: medications verified and updated    ALLERGIES:   Allergen Reactions   • Amoclan DIARRHEA     Significant, watery diarrhea 15x/day then resolved after course completion. Also vaginal yeast infection and thrush after course.        PCP: No Pcp    BP (!) 146/90 (BP Location: LUE - Left upper extremity, Patient Position: Sitting)   Pulse (!) 101   Temp 97.4 °F (36.3 °C) (Temporal)   Resp 16   Ht 5' 3.5\" (1.613 m)   Wt 88.1 kg (194 lb 3.2 oz)      Pregnant no  Breastfeeding no      Patient here Derian    Patient would not like their family or friends informed of their status during treatment.       Patient would like communication of their results via:    Cell Phone:   Telephone Information:   Mobile [phone]     Okay to leave a message containing results? Yes    Health Maintenance Due   Topic Date Due   • Hepatitis B Vaccine (1 of 3 - 3-dose series) Never done   • COVID-19 Vaccine (1) Never done   • Depression Screening  Never done   • DTaP/Tdap/Td Vaccine (1 - Tdap) Never done   • Cervical Cancer Screen 30-64 -  Never done   • Breast Cancer Screening  Never done   • Colorectal Cancer Screen-  Never done   • Shingles Vaccine (1 of 2) Never done   • Influenza Vaccine (1)  done Never done

## 2024-10-18 ENCOUNTER — APPOINTMENT (OUTPATIENT)
Dept: OBSTETRICS AND GYNECOLOGY | Facility: CLINIC | Age: 79
End: 2024-10-18
Payer: MEDICARE

## 2024-10-18 VITALS
BODY MASS INDEX: 35.34 KG/M2 | DIASTOLIC BLOOD PRESSURE: 78 MMHG | WEIGHT: 180 LBS | HEIGHT: 60 IN | SYSTOLIC BLOOD PRESSURE: 145 MMHG

## 2024-10-18 DIAGNOSIS — Z01.419 ENCOUNTER FOR GYNECOLOGICAL EXAMINATION WITHOUT ABNORMAL FINDING: Primary | ICD-10-CM

## 2024-10-18 DIAGNOSIS — M85.80 OSTEOPENIA, UNSPECIFIED LOCATION: ICD-10-CM

## 2024-10-18 DIAGNOSIS — C50.919 MALIGNANT NEOPLASM OF FEMALE BREAST, UNSPECIFIED ESTROGEN RECEPTOR STATUS, UNSPECIFIED LATERALITY, UNSPECIFIED SITE OF BREAST: ICD-10-CM

## 2024-10-18 DIAGNOSIS — N95.2 ATROPHIC VAGINITIS: ICD-10-CM

## 2024-10-18 NOTE — PROGRESS NOTES
Subjective   uSnni Pérez is a 78 y.o. female here for GYN care.  She was last seen in 2022.  She has a Pap in 2018 that was negative.    She has a history of right breast cancer in May 2019 and is current on her breast imaging.    There is no postmenopausal bleeding or discharge.  No dysuria or change in bowel habits.    She is off anastrozole.  Her bone density in 2024 showed osteopenia, T-score -2.0.    We discussed colon cancer screening which she has declined.    Personal health questionnaire reviewed: yes.     Gynecologic History  No LMP recorded. Patient is postmenopausal.  Contraception: post menopausal status  Last Pap: 18. Results were: normal  Last mammogram: 3/28/24. Results were: normal    Obstetric History  OB History    Para Term  AB Living   2 2 2         SAB IAB Ectopic Multiple Live Births                  # Outcome Date GA Lbr James/2nd Weight Sex Type Anes PTL Lv   2 Term            1 Term                Objective   Constitutional: Alert and in no acute distress. Well developed, well nourished.   Head and Face: Head and face: Normal.    Eyes: Normal external exam - nonicteric sclera, extraocular movements intact (EOMI) and no ptosis.   Neck: No neck asymmetry. Supple. Thyroid not enlarged and there were no palpable thyroid nodules.    Pulmonary: No respiratory distress.   Chest: Breasts: Normal appearance, no nipple discharge and no skin changes. Palpation of breasts and axillae: No palpable mass and no axillary lymphadenopathy.   Abdomen: Soft nontender; no abdominal mass palpated. No organomegaly. No hernias.   Genitourinary: External genitalia: Normal. No inguinal lymphadenopathy. Bartholin's Urethral and Skenes Glands: Normal. Urethra: Normal.  Bladder: Normal on palpation. Vagina: Normal. Cervix: Normal.  Uterus: Normal.  Right Adnexa/parametria: Normal.  Left Adnexa/parametria: Normal.  Inspection of Perianal Area: Normal.   Musculoskeletal: No  joint swelling seen, normal movements of all extremities.   Skin: Normal skin color and pigmentation, normal skin turgor, and no rash.   Neurologic: Non-focal. Grossly intact.   Psychiatric: Alert and oriented x 3. Affect normal to patient baseline. Mood: Appropriate.  Physical Exam     Assessment/Plan   This is a 78-year-old female with a history of right breast cancer.  Her exam is benign.  She is current on her breast imaging with the breast specialist.    No Pap smear was sent she had a normal Pap in 2018 and no further Pap smears are needed.    We discussed colon cancer screening options such as colonoscopy or Cologuard but she declines.   She has osteopenia but has stopped anastrozole.  I recommend dietary calcium, vitamin D supplement and weightbearing exercise in menopause for bone health.  The next bone density test is due in August 2026.   I will see her in 2 years.    Education reviewed: self breast exams.

## 2024-12-03 ENCOUNTER — APPOINTMENT (OUTPATIENT)
Dept: PRIMARY CARE | Facility: CLINIC | Age: 79
End: 2024-12-03
Payer: MEDICARE

## 2024-12-10 ENCOUNTER — APPOINTMENT (OUTPATIENT)
Dept: PRIMARY CARE | Facility: CLINIC | Age: 79
End: 2024-12-10
Payer: MEDICARE

## 2024-12-12 ENCOUNTER — OFFICE VISIT (OUTPATIENT)
Dept: URGENT CARE | Age: 79
End: 2024-12-12
Payer: MEDICARE

## 2024-12-12 VITALS
RESPIRATION RATE: 16 BRPM | DIASTOLIC BLOOD PRESSURE: 56 MMHG | SYSTOLIC BLOOD PRESSURE: 142 MMHG | HEART RATE: 63 BPM | TEMPERATURE: 98.1 F | OXYGEN SATURATION: 95 %

## 2024-12-12 DIAGNOSIS — J32.4 PANSINUSITIS, UNSPECIFIED CHRONICITY: ICD-10-CM

## 2024-12-12 DIAGNOSIS — U07.1 COVID-19: Primary | ICD-10-CM

## 2024-12-12 RX ORDER — METHYLPREDNISOLONE 4 MG/1
TABLET ORAL
Qty: 21 TABLET | Refills: 0 | Status: SHIPPED | OUTPATIENT
Start: 2024-12-12 | End: 2024-12-18

## 2024-12-12 RX ORDER — DOXYCYCLINE 100 MG/1
100 CAPSULE ORAL 2 TIMES DAILY
Qty: 20 CAPSULE | Refills: 0 | Status: SHIPPED | OUTPATIENT
Start: 2024-12-12 | End: 2024-12-22

## 2024-12-12 RX ORDER — BENZONATATE 100 MG/1
100 CAPSULE ORAL 3 TIMES DAILY PRN
Qty: 42 CAPSULE | Refills: 0 | Status: SHIPPED | OUTPATIENT
Start: 2024-12-12 | End: 2025-01-11

## 2024-12-12 ASSESSMENT — ENCOUNTER SYMPTOMS
SINUS PRESSURE: 1
SINUS PAIN: 1
COUGH: 0
FATIGUE: 1
SHORTNESS OF BREATH: 0

## 2024-12-12 ASSESSMENT — PAIN SCALES - GENERAL: PAINLEVEL_OUTOF10: 9

## 2024-12-12 NOTE — PROGRESS NOTES
Subjective   Patient ID: Sunni Pérez is a 79 y.o. female. They present today with a chief complaint of URI (Sinus congestion worsening over the past week. Last week felt worse.  diagnosed with Covid last week. ).    History of Present Illness  Ill for the last few days with fever chills cough and congestion.  She is starting to feel some pain and pressure in her sinuses.  She feels that she probably has COVID as her  tested positive for it.  But this is the fourth time she has had COVID and it always turns into a sinus infection.      History provided by:  Patient   used: No    URI  Presenting symptoms: congestion and fatigue    Presenting symptoms: no cough    Associated symptoms: sinus pain        Past Medical History  Allergies as of 12/12/2024 - Reviewed 12/12/2024   Allergen Reaction Noted    Albuterol Itching 06/04/2023    Budesonide-formoterol Other 02/28/2024    Erythromycin Other 02/28/2024    Ceclor [cefaclor] Rash 04/25/2023    Ciprofloxacin Rash 04/25/2023    Iodinated contrast media Unknown 04/25/2023    Penicillins Rash 04/25/2023    Quinolones Rash 04/25/2023    Zithromax z-michelle [azithromycin] Rash 04/25/2023       (Not in a hospital admission)       History reviewed. No pertinent past medical history.    Past Surgical History:   Procedure Laterality Date    BI MAMMO GUIDED BREAST RIGHT LOCALIZATION Right 6/10/2019    BI MAMMO GUIDED LOCALIZATION BREAST RIGHT 6/10/2019 [facility] ANCILLARY LEGACY    OTHER SURGICAL HISTORY  05/09/2019    Laparoscopy    OTHER SURGICAL HISTORY  06/27/2019    Mastectomy partial        reports that she has never smoked. She has never used smokeless tobacco. She reports that she does not drink alcohol and does not use drugs.    Review of Systems  Review of Systems   Constitutional:  Positive for fatigue.   HENT:  Positive for congestion, sinus pressure and sinus pain.    Respiratory:  Negative for cough and shortness of breath.                                    Objective    Vitals:    12/12/24 1359   BP: (!) 130/48   Pulse: 63   Resp: 16   Temp: 36.7 °C (98.1 °F)   SpO2: 95%     No LMP recorded. Patient is postmenopausal.    Physical Exam  Vitals (Will recheck blood pressure prior to discharge) and nursing note reviewed.   Constitutional:       General: She is not in acute distress.     Comments: Alert oriented well-nourished well-developed 79-year-old female here with her .   HENT:      Head: Normocephalic and atraumatic.      Right Ear: Tympanic membrane, ear canal and external ear normal.      Left Ear: Tympanic membrane, ear canal and external ear normal.      Nose: Congestion present.      Comments: Paranasal sinuses are tender to percussion     Mouth/Throat:      Mouth: Mucous membranes are moist.      Pharynx: Oropharynx is clear.   Cardiovascular:      Rate and Rhythm: Normal rate and regular rhythm.      Heart sounds: Normal heart sounds.   Pulmonary:      Effort: Pulmonary effort is normal. No respiratory distress.      Breath sounds: Normal breath sounds.   Musculoskeletal:      Cervical back: No rigidity.   Skin:     General: Skin is warm and dry.   Neurological:      General: No focal deficit present.      Mental Status: She is oriented to person, place, and time.   Psychiatric:         Mood and Affect: Mood normal.         Behavior: Behavior normal.         Procedures    Point of Care Test & Imaging Results from this visit  No results found for this visit on 12/12/24.   No results found.    Diagnostic study results (if any) were reviewed by Dian Aviles PA-C.    Assessment/Plan   Allergies, medications, history, and pertinent labs/EKGs/Imaging reviewed by Dian Aviles PA-C.     Medical Decision Making  History and physical examination are consistent with a sinusitis.  Her  tested positive for COVID a few days ago she is also ill with similar symptoms and he most likely has COVID as well.  She tells me this is  her fourth episode of COVID and that each time in the past it is turned into a sinusitis.  The antibiotic that has been effective in the past is doxycycline.  This does cause her some GI distress.  She was advised to take it with a glass of water, then take the capsule, and followed by another full glass of water.  Will also treat her bronchospasm with Medrol Dosepak and Tessalon Perles.  She is allergic to albuterol.  If she feels worse instead of better, especially chest pain shortness of breath high fevers difficulty breathing malaise she is to go to the emergency room for follow-up 142/56 with a recheck blood pressure.  Her blood pressure is improving.  She denies any lightheadedness weakness or other symptoms of low blood pressure.    Orders and Diagnoses  There are no diagnoses linked to this encounter.    Medical Admin Record      Patient disposition: Home    Electronically signed by Dian Aviles PA-C  2:13 PM

## 2024-12-30 ENCOUNTER — TELEPHONE (OUTPATIENT)
Dept: PRIMARY CARE | Facility: CLINIC | Age: 79
End: 2024-12-30
Payer: MEDICARE

## 2025-02-18 DIAGNOSIS — J45.40 MODERATE PERSISTENT ASTHMA WITHOUT COMPLICATION (HHS-HCC): ICD-10-CM

## 2025-02-18 RX ORDER — FLUTICASONE PROPIONATE AND SALMETEROL 250; 50 UG/1; UG/1
POWDER RESPIRATORY (INHALATION)
Qty: 60 EACH | Refills: 0 | Status: SHIPPED | OUTPATIENT
Start: 2025-02-18

## 2025-03-13 ENCOUNTER — APPOINTMENT (OUTPATIENT)
Dept: PRIMARY CARE | Facility: CLINIC | Age: 80
End: 2025-03-13
Payer: MEDICARE

## 2025-03-13 VITALS
SYSTOLIC BLOOD PRESSURE: 133 MMHG | BODY MASS INDEX: 33.4 KG/M2 | DIASTOLIC BLOOD PRESSURE: 75 MMHG | HEART RATE: 67 BPM | TEMPERATURE: 96.5 F | WEIGHT: 171 LBS

## 2025-03-13 DIAGNOSIS — E78.00 HYPERCHOLESTEROLEMIA: ICD-10-CM

## 2025-03-13 DIAGNOSIS — R73.02 IGT (IMPAIRED GLUCOSE TOLERANCE): ICD-10-CM

## 2025-03-13 DIAGNOSIS — F41.8 DEPRESSION WITH ANXIETY: ICD-10-CM

## 2025-03-13 DIAGNOSIS — Z13.6 SCREENING FOR CARDIOVASCULAR CONDITION: ICD-10-CM

## 2025-03-13 DIAGNOSIS — I10 BENIGN ESSENTIAL HTN: Primary | ICD-10-CM

## 2025-03-13 PROCEDURE — 1157F ADVNC CARE PLAN IN RCRD: CPT | Performed by: INTERNAL MEDICINE

## 2025-03-13 PROCEDURE — 99214 OFFICE O/P EST MOD 30 MIN: CPT | Performed by: INTERNAL MEDICINE

## 2025-03-13 PROCEDURE — G2211 COMPLEX E/M VISIT ADD ON: HCPCS | Performed by: INTERNAL MEDICINE

## 2025-03-13 PROCEDURE — 1126F AMNT PAIN NOTED NONE PRSNT: CPT | Performed by: INTERNAL MEDICINE

## 2025-03-13 PROCEDURE — 3075F SYST BP GE 130 - 139MM HG: CPT | Performed by: INTERNAL MEDICINE

## 2025-03-13 PROCEDURE — 1160F RVW MEDS BY RX/DR IN RCRD: CPT | Performed by: INTERNAL MEDICINE

## 2025-03-13 PROCEDURE — 1159F MED LIST DOCD IN RCRD: CPT | Performed by: INTERNAL MEDICINE

## 2025-03-13 PROCEDURE — 3078F DIAST BP <80 MM HG: CPT | Performed by: INTERNAL MEDICINE

## 2025-03-13 PROCEDURE — 1036F TOBACCO NON-USER: CPT | Performed by: INTERNAL MEDICINE

## 2025-03-13 RX ORDER — VENLAFAXINE HYDROCHLORIDE 150 MG/1
150 CAPSULE, EXTENDED RELEASE ORAL DAILY
Qty: 90 CAPSULE | Refills: 3 | Status: SHIPPED | OUTPATIENT
Start: 2025-03-13

## 2025-03-13 RX ORDER — AMLODIPINE BESYLATE 5 MG/1
5 TABLET ORAL DAILY
Qty: 90 TABLET | Refills: 3 | Status: SHIPPED | OUTPATIENT
Start: 2025-03-13

## 2025-03-13 ASSESSMENT — ENCOUNTER SYMPTOMS
COUGH: 0
FEVER: 0
PALPITATIONS: 0
CHILLS: 0
POLYDIPSIA: 0
SHORTNESS OF BREATH: 0

## 2025-03-13 ASSESSMENT — PAIN SCALES - GENERAL: PAINLEVEL_OUTOF10: 0-NO PAIN

## 2025-03-13 NOTE — ASSESSMENT & PLAN NOTE
Orders:    venlafaxine XR (Effexor-XR) 150 mg 24 hr capsule; Take 1 capsule (150 mg) by mouth once daily. Do not crush or chew.

## 2025-03-13 NOTE — PROGRESS NOTES
Subjective   Patient ID: Sunni Pérez is a 79 y.o. female who presents for Follow-up.    79-year-old female presents today for routine follow-up she has been in her usual state of health without acute concerns at this time.  She is interested in performing a CT calcium score was educated about the benefits and limitations of this exam.  She will complete her annual blood work a little ahead of schedule in order to be able to compare against the calcium score.  She is asymptomatic for cardiac disease at this time without symptoms of chest pain shortness of breath peripheral edema or exertional fatigue.    She has her 3D mammogram scheduled for later this spring.    There are medications that were reviewed and sent for refills at this time.         Review of Systems   Constitutional:  Negative for chills and fever.   Respiratory:  Negative for cough and shortness of breath.    Cardiovascular:  Negative for chest pain and palpitations.   Endocrine: Negative for polydipsia and polyuria.       Objective   /75 (BP Location: Left arm, Patient Position: Sitting, BP Cuff Size: Adult)   Pulse 67   Temp 35.8 °C (96.5 °F) (Temporal)   Wt 77.6 kg (171 lb)   BMI 33.40 kg/m²     Physical Exam  Constitutional:       Appearance: Normal appearance.   HENT:      Head: Normocephalic and atraumatic.   Eyes:      Extraocular Movements: Extraocular movements intact.      Pupils: Pupils are equal, round, and reactive to light.   Neck:      Thyroid: No thyroid mass or thyromegaly.      Vascular: No carotid bruit.   Cardiovascular:      Rate and Rhythm: Normal rate and regular rhythm.      Heart sounds: No murmur heard.     No friction rub. No gallop.   Pulmonary:      Effort: No respiratory distress.      Breath sounds: No wheezing, rhonchi or rales.   Musculoskeletal:      Cervical back: Neck supple.      Right lower leg: No edema.      Left lower leg: No edema.   Lymphadenopathy:      Cervical: No cervical adenopathy.    Neurological:      Mental Status: She is alert.         Assessment/Plan   Assessment & Plan  Benign essential HTN    Orders:    CT cardiac scoring wo IV contrast; Future    Lipid Panel; Future    CBC; Future    Comprehensive Metabolic Panel; Future    Hemoglobin A1C; Future    amLODIPine (Norvasc) 5 mg tablet; Take 1 tablet (5 mg) by mouth once daily. as directed    Screening for cardiovascular condition    Orders:    CT cardiac scoring wo IV contrast; Future    Lipid Panel; Future    CBC; Future    Comprehensive Metabolic Panel; Future    Hemoglobin A1C; Future    IGT (impaired glucose tolerance)    Orders:    Lipid Panel; Future    CBC; Future    Comprehensive Metabolic Panel; Future    Hemoglobin A1C; Future    Hypercholesterolemia    Orders:    CT cardiac scoring wo IV contrast; Future    Lipid Panel; Future    CBC; Future    Comprehensive Metabolic Panel; Future    Hemoglobin A1C; Future    Depression with anxiety    Orders:    venlafaxine XR (Effexor-XR) 150 mg 24 hr capsule; Take 1 capsule (150 mg) by mouth once daily. Do not crush or chew.

## 2025-03-13 NOTE — ASSESSMENT & PLAN NOTE
Orders:    CT cardiac scoring wo IV contrast; Future    Lipid Panel; Future    CBC; Future    Comprehensive Metabolic Panel; Future    Hemoglobin A1C; Future    amLODIPine (Norvasc) 5 mg tablet; Take 1 tablet (5 mg) by mouth once daily. as directed

## 2025-03-13 NOTE — ASSESSMENT & PLAN NOTE
Orders:    Lipid Panel; Future    CBC; Future    Comprehensive Metabolic Panel; Future    Hemoglobin A1C; Future

## 2025-03-13 NOTE — ASSESSMENT & PLAN NOTE
Orders:    CT cardiac scoring wo IV contrast; Future    Lipid Panel; Future    CBC; Future    Comprehensive Metabolic Panel; Future    Hemoglobin A1C; Future

## 2025-03-21 DIAGNOSIS — J45.40 MODERATE PERSISTENT ASTHMA WITHOUT COMPLICATION (HHS-HCC): ICD-10-CM

## 2025-03-21 RX ORDER — FLUTICASONE PROPIONATE AND SALMETEROL 250; 50 UG/1; UG/1
POWDER RESPIRATORY (INHALATION)
Qty: 60 EACH | Refills: 5 | Status: SHIPPED | OUTPATIENT
Start: 2025-03-21

## 2025-04-03 ENCOUNTER — OFFICE VISIT (OUTPATIENT)
Dept: HEMATOLOGY/ONCOLOGY | Facility: CLINIC | Age: 80
End: 2025-04-03
Payer: MEDICARE

## 2025-04-03 ENCOUNTER — HOSPITAL ENCOUNTER (OUTPATIENT)
Dept: RADIOLOGY | Facility: CLINIC | Age: 80
Discharge: HOME | End: 2025-04-03
Payer: MEDICARE

## 2025-04-03 VITALS
SYSTOLIC BLOOD PRESSURE: 144 MMHG | HEART RATE: 61 BPM | OXYGEN SATURATION: 96 % | BODY MASS INDEX: 33.01 KG/M2 | TEMPERATURE: 98.1 F | DIASTOLIC BLOOD PRESSURE: 79 MMHG | WEIGHT: 169 LBS

## 2025-04-03 DIAGNOSIS — Z85.3 PERSONAL HISTORY OF BREAST CANCER: ICD-10-CM

## 2025-04-03 DIAGNOSIS — Z51.81 ENCOUNTER FOR MONITORING AROMATASE INHIBITOR THERAPY: ICD-10-CM

## 2025-04-03 DIAGNOSIS — Z12.31 SCREENING MAMMOGRAM FOR BREAST CANCER: ICD-10-CM

## 2025-04-03 DIAGNOSIS — Z79.811 ENCOUNTER FOR MONITORING AROMATASE INHIBITOR THERAPY: ICD-10-CM

## 2025-04-03 DIAGNOSIS — Z09 ONCOLOGY FOLLOW-UP ENCOUNTER: ICD-10-CM

## 2025-04-03 DIAGNOSIS — Z92.21 HISTORY OF AROMATASE INHIBITOR THERAPY: Primary | ICD-10-CM

## 2025-04-03 PROCEDURE — 1159F MED LIST DOCD IN RCRD: CPT | Performed by: NURSE PRACTITIONER

## 2025-04-03 PROCEDURE — 77067 SCR MAMMO BI INCL CAD: CPT

## 2025-04-03 PROCEDURE — 3077F SYST BP >= 140 MM HG: CPT | Performed by: NURSE PRACTITIONER

## 2025-04-03 PROCEDURE — 99215 OFFICE O/P EST HI 40 MIN: CPT | Performed by: NURSE PRACTITIONER

## 2025-04-03 PROCEDURE — 1036F TOBACCO NON-USER: CPT | Performed by: NURSE PRACTITIONER

## 2025-04-03 PROCEDURE — 1160F RVW MEDS BY RX/DR IN RCRD: CPT | Performed by: NURSE PRACTITIONER

## 2025-04-03 PROCEDURE — 1126F AMNT PAIN NOTED NONE PRSNT: CPT | Performed by: NURSE PRACTITIONER

## 2025-04-03 PROCEDURE — 3078F DIAST BP <80 MM HG: CPT | Performed by: NURSE PRACTITIONER

## 2025-04-03 PROCEDURE — 1157F ADVNC CARE PLAN IN RCRD: CPT | Performed by: NURSE PRACTITIONER

## 2025-04-03 ASSESSMENT — PAIN SCALES - GENERAL: PAINLEVEL_OUTOF10: 0-NO PAIN

## 2025-04-03 ASSESSMENT — ENCOUNTER SYMPTOMS
DEPRESSION: 1
OCCASIONAL FEELINGS OF UNSTEADINESS: 0
LOSS OF SENSATION IN FEET: 0

## 2025-04-03 NOTE — PATIENT INSTRUCTIONS
Please call us at [phone] option 5 then option 2 with any questions or concerns.    1. Exercise 2.5 hours per week; bone strengthening, cardio-vascular, resistance training.  2. Please do self breast exams monthly.  3. Keep alcohol under 3 drinks per week.  4. Sun safety - limit sun exposure from 11a-2p when its at its hottest, apply 15-30 sun block and re-apply every 1-2 hours if perspiring or swimming.  5. Eat a plant based diet, add in oily fishes such as mackerel, tuna, and salmon.  6. Get in at least 1,000 mg of calcium per day through diet or supplement for bone strength. Examples of foods higher in calcium are milk, yogurt, fruited yogurt, oranges, fortified orange juice, almonds, almond milk, broccoli, spinach, bok fide, mustard greens, puddings, custards, ice cream, fortified cereals, bars, and crackers.   7. Exam was negative.   8. Please call the office if any new mass or rash in or around breast, or any uncontrolled symptoms that last over 2-3 weeks at [phone].  9. Team will reach out to you for all abnormal results. Mammogram results can take 7 days to result in Epic. Please call for other results in 1-2 days if testing done at [phone].   10. It was nice seeing you today, Sunni. I will see you back in 1 yr with mammogram. Thank you for choosing [facility] with your care.   Have a nice spring and summer!

## 2025-04-03 NOTE — PROGRESS NOTES
"Oncology Follow-Up    Sunni Pérez  [id number]                   Breast         AJCC Edition: 8th (AJCC), Diagnosis Date: Jun 2019, IA, pT2 pN0 cM0 G1      Treatment History:    1. Abnormal screening mammogram on 4/17/2019 showed an area of focal asymmetry in the central right breast. Diagnostic views performed 5/9/2019 revealed developing  asymmetry in the superior right breast. There was no ultrasound correlate.   2. On 5/15/2019 she underwent a right breast core biopsy. Pathology revealed invasive mammary carcinoma, grade 1. ER and SD both > 95%. HER-2/shannan was 0 on IHC.   3. On 6/14/2019 she underwent a right partial mastectomy with sentinel lymph node biopsy. Pathology revealed a 2.1 cm invasive mammary carcinoma, grade 1. 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 The resection margins were negative. 0/2 SLN.   4. Radiation consult completed and radiation not elected.  5. Anastrozole initiated 7/10/19.  6. Anastrozole discontinue 7/2024 after 5 years of treatment.      Selma Morales presents for her Oncology Follow Up Visit. She had gained \"a lot of weight\" and has now lost 10 pounds. She is not doing monthly Breast self exams. Carmen stopped anastrozole this past July. Carmen is caring for her  who has Parkinson's disease. She rates her energy level as 5/10 as she has always been low energy. She reports a 3/10 for distress. Carmen has never had a colonoscopy, she has not had an RSV vaccine and did not get any more Covid vaccines due to a bad reaction 7 days after her injection. Carmen sees Dr. William in primary care. Carmen denies any unusual headaches, balance issues, depression, cough, shortness of breath, problems swallowing, changes in chest/breast area, abdominal pain, bone or muscle pain, vaginal bleeding, rectal bleeding, blood in the urine, vaginal dryness, swelling arms or legs, new or unusual skin moles or lesions. "     Objective      Vitals:    04/03/25 1517   BP: 144/79   Pulse: 61   Temp: 36.7 °C (98.1 °F)   SpO2: 96%        Constitutional: Well developed, alert/oriented x3, no distress, cooperative   Eyes: clear sclera   ENMT: mucous membranes moist, no apparent lesions   Head/Neck: Neck supple, no bruits   Respiratory/Thorax: Patent airways, normal breath sounds with good chest expansion   Cardiovascular: Regular rate and rhythm, no murmurs, 2+ equal pulses of the extremities,   Gastrointestinal: Nondistended, soft, non-tender, no masses palpable, no organomegaly   Musculoskeletal: ROM intact, no joint swelling, normal strength   Extremities: normal extremities, no edema, cyanosis, contusions or wounds   Neurological: alert and oriented x3,  normal strength   Breast:     Lymphatic: No significant lymphadenopathy   Psychological: Appropriate mood and behavior   Skin: Warm and dry, no lesions, no rashes      Physical Exam  Chest:          Comments: Right breast positive for breast conserving surgery with well healed central/inferior and right axillary incisions; no masses, nodules, skin changes, discharge. Left breast without masses, nodules, skin changes, discharge.          Lab Results   Component Value Date    WBC 7.3 06/28/2024    HGB 12.3 06/28/2024    HCT 38.4 06/28/2024    MCV 85 06/28/2024     06/28/2024       Chemistry    Lab Results   Component Value Date/Time     06/28/2024 1305    K 4.0 06/28/2024 1305     06/28/2024 1305    CO2 26 06/28/2024 1305    BUN 20 06/28/2024 1305    CREATININE 0.96 06/28/2024 1305    Lab Results   Component Value Date/Time    CALCIUM 9.7 06/28/2024 1305    ALKPHOS 108 06/28/2024 1305    AST 20 06/28/2024 1305    ALT 20 06/28/2024 1305    BILITOT 0.6 06/28/2024 1305         Imaging:  Mammogram results pending at time of this dictation.    Exam Information    Status Exam Begun Exam Ended   Final 8/13/2024 14:16 8/13/2024 14:40     Study Result    Narrative & Impression    Interpreted By:  Bonny Kidd,   STUDY:  DEXA BONE DENSITY8/13/2024 2:40 pm      INDICATION:  Signs/Symptoms:osteopenia follow up. The patient is a 79 y/o  year  old F.      COMPARISON:  Multiple exams including 01/28/2022, 01/23/2017, baseline 10/19/2004      ACCESSION NUMBER(S):  [id number]      ORDERING CLINICIAN:  SARTHAK MCDERMOTT      TECHNIQUE:  DEXA BONE DENSITY      FINDINGS:  SPINE L1-L4  Bone Mineral Density: 1.124  T-Score -0.5  Z-Score 1.3  Bone Mineral Density change vs baseline: 12.7%  Bone Mineral Density change vs previous: -2.2%      LEFT FEMUR -TOTAL  Bone Mineral Density: 0.851  T-Score -1.2   Z-Score  0.7  Bone Mineral Density change vs baseline: -2.0%  Bone Mineral Density change vs previous: -2.9%      LEFT FEMUR -NECK  Bone Mineral Density: 0.756  T-Score -2.0  Z-Score 0.1  Bone Mineral Density change vs baseline: -6.3%  Bone Mineral Density change vs previous: 0.3%          World Health Organization (WHO) criteria for post-menopausal,   Women:  Normal:         T-score at or above -1 SD  Osteopenia:   T-score between -1 and -2.5 SD  Osteoporosis: T-score at or below -2.5 SD           Assessment/Plan    Carmen is a 80 yo woman with a hx of T2N0 right invasive mammary cancer G-1 diagnosed June 2019. She is s/p partial mastectomy, for a 2.1cm tumor and DCIS, 0/2 nodes involved, XRT, and completed 5 years of anastrozole in July 2024. There is no evidence of recurrent disease on today's exam.     Plan:  Exam is negative.  Mammogram is pending.  Encouraged monthly breast self exams, plant based diet, keep alcohol <3 drinks/week, exercise at least 2.5 hours/week.  We reviewed signs/symptoms of recurrence including new masses, new pigmented lesion, tugging or pulling of the skin, nipple discharge, rash in or around the chest area, or any new finding that doesn't resolve within a 2-3 weeks.  All of Carmen's questions/concerns were addressed.  Over 25 minutes of time was spent with this patient  with >50% of the time with education, counseling, and coordination of care.   I will see Carmen back in one year with her mammogram. She will call with any concerns.    Diagnoses and all orders for this visit:  History of aromatase inhibitor therapy  Personal history of breast cancer  -     Clinic Appointment Request Follow Up; KARIME ESCALERA  -     EDWAR mammo bilateral screening tomosynthesis; Future  Oncology follow-up encounter  Screening mammogram for breast cancer  -     BI mammo bilateral screening tomosynthesis; Future        Karime Escalera, FRANCIA-CNP

## 2025-04-11 ENCOUNTER — TELEPHONE (OUTPATIENT)
Dept: HEMATOLOGY/ONCOLOGY | Facility: HOSPITAL | Age: 80
End: 2025-04-11

## 2025-05-14 ENCOUNTER — OFFICE VISIT (OUTPATIENT)
Dept: URGENT CARE | Age: 80
End: 2025-05-14
Payer: MEDICARE

## 2025-05-14 VITALS
SYSTOLIC BLOOD PRESSURE: 140 MMHG | BODY MASS INDEX: 32.42 KG/M2 | RESPIRATION RATE: 16 BRPM | OXYGEN SATURATION: 96 % | HEART RATE: 60 BPM | TEMPERATURE: 98.1 F | DIASTOLIC BLOOD PRESSURE: 69 MMHG | WEIGHT: 166 LBS

## 2025-05-14 DIAGNOSIS — L03.90 CELLULITIS, UNSPECIFIED CELLULITIS SITE: Primary | ICD-10-CM

## 2025-05-14 PROCEDURE — 1125F AMNT PAIN NOTED PAIN PRSNT: CPT | Performed by: NURSE PRACTITIONER

## 2025-05-14 PROCEDURE — 99213 OFFICE O/P EST LOW 20 MIN: CPT | Performed by: NURSE PRACTITIONER

## 2025-05-14 PROCEDURE — 1159F MED LIST DOCD IN RCRD: CPT | Performed by: NURSE PRACTITIONER

## 2025-05-14 PROCEDURE — 3078F DIAST BP <80 MM HG: CPT | Performed by: NURSE PRACTITIONER

## 2025-05-14 PROCEDURE — 3077F SYST BP >= 140 MM HG: CPT | Performed by: NURSE PRACTITIONER

## 2025-05-14 RX ORDER — MUPIROCIN 20 MG/G
OINTMENT TOPICAL
Qty: 22 G | Refills: 0 | Status: SHIPPED | OUTPATIENT
Start: 2025-05-14 | End: 2025-05-24

## 2025-05-14 ASSESSMENT — PAIN SCALES - GENERAL: PAINLEVEL_OUTOF10: 9

## 2025-05-14 NOTE — PROGRESS NOTES
Subjective   Patient ID: Sunni Pérez is a 79 y.o. female. They present today with a chief complaint of Rash (Small red area on left and right lower abdomen/pelvic area times 3 days.).    History of Present Illness  Patient presents with a rash to her lower abdomen under her skin fold.  States she noticed it today.  C/o pain.      Past Medical History  Allergies as of 05/14/2025 - Reviewed 05/14/2025   Allergen Reaction Noted    Albuterol Itching 06/04/2023    Budesonide-formoterol Other 02/28/2024    Erythromycin Other 02/28/2024    Ceclor [cefaclor] Rash 04/25/2023    Ciprofloxacin Rash 04/25/2023    Iodinated contrast media Unknown 04/25/2023    Penicillins Rash 04/25/2023    Quinolones Rash 04/25/2023    Zithromax z-michelle [azithromycin] Rash 04/25/2023       Prescriptions Prior to Admission[1]     Medical History[2]    Surgical History[3]     reports that she has never smoked. She has never been exposed to tobacco smoke. She has never used smokeless tobacco. She reports that she does not drink alcohol and does not use drugs.    Review of Systems  Review of Systems     See HPI                          Objective    There were no vitals filed for this visit.  No LMP recorded. Patient is postmenopausal.    Physical Exam  Bilateral erythema spots ti right and left lower Abdomen.  No vesicles.      Constitutional:       Appearance: Normal appearance.   HENT:      Head: Normocephalic and atraumatic.   Cardiovascular:      Rate and Rhythm: Normal rate and regular rhythm.      Pulses: Normal pulses.      Heart sounds: Normal heart sounds.   Pulmonary:      Effort: Pulmonary effort is normal.      Breath sounds: Normal breath sounds.     Procedures    Point of Care Test & Imaging Results from this visit  No results found for this visit on 05/14/25.   Imaging  No results found.    Cardiology, Vascular, and Other Imaging  No other imaging results found for the past 2 days      Diagnostic study results (if any) were  reviewed by CHINA Toribio.    Assessment/Plan   Allergies, medications, history, and pertinent labs/EKGs/Imaging reviewed by CHINA Toribio.     Medical Decision Making  At time of discharge patient was clinically well-appearing and HDS for outpatient management. The patient and/or family was educated regarding diagnosis, supportive care, OTC and Rx medications. The patient and/or family was given the opportunity to ask questions prior to discharge.  They verbalized understanding of my discussion of the plans for treatment, expected course, indications to return to  or seek further evaluation in ED, and the need for timely follow up as directed.   They were provided with a work/school excuse if requested.    Orders and Diagnoses  There are no diagnoses linked to this encounter.    Medical Admin Record      Patient disposition: Home    Electronically signed by CHINA Toribio  3:59 PM           [1] (Not in a hospital admission)  [2] No past medical history on file.  [3]   Past Surgical History:  Procedure Laterality Date    BI MAMMO GUIDED BREAST RIGHT LOCALIZATION Right 6/10/2019    BI MAMMO GUIDED LOCALIZATION BREAST RIGHT 6/10/2019 [facility] ANCILLARY LEGACY    OTHER SURGICAL HISTORY  05/09/2019    Laparoscopy    OTHER SURGICAL HISTORY  06/27/2019    Mastectomy partial

## 2025-05-17 ENCOUNTER — OFFICE VISIT (OUTPATIENT)
Dept: URGENT CARE | Age: 80
End: 2025-05-17
Payer: MEDICARE

## 2025-05-17 VITALS
WEIGHT: 166 LBS | SYSTOLIC BLOOD PRESSURE: 152 MMHG | DIASTOLIC BLOOD PRESSURE: 72 MMHG | TEMPERATURE: 98.2 F | OXYGEN SATURATION: 95 % | HEART RATE: 62 BPM | BODY MASS INDEX: 32.42 KG/M2

## 2025-05-17 DIAGNOSIS — R21 RASH: Primary | ICD-10-CM

## 2025-05-17 RX ORDER — PREDNISONE 20 MG/1
40 TABLET ORAL DAILY
Qty: 10 TABLET | Refills: 0 | Status: SHIPPED | OUTPATIENT
Start: 2025-05-17 | End: 2025-05-22

## 2025-05-17 RX ORDER — TRIAMCINOLONE ACETONIDE 1 MG/G
CREAM TOPICAL
Qty: 15 G | Refills: 0 | Status: SHIPPED | OUTPATIENT
Start: 2025-05-17 | End: 2025-07-16

## 2025-05-17 NOTE — PATIENT INSTRUCTIONS
You were seen at Urgent Care today for a rash.  Please treat as discussed. Please take medications as prescribed. Monitor for red flags which we spoke about, If your symptoms change, worsen or become concerning in any way, please go to the emergency room immediately, otherwise you can followup with your PCP in 2-3 days as needed

## 2025-05-17 NOTE — PROGRESS NOTES
Subjective   Patient ID: Sunni Pérez is a 79 y.o. female. They present today with a chief complaint of Rash (Pt states was seen for shingles x3days, lft side hip this morning. Feels itchy and lighting pain).    History of Present Illness   patient is a 79-year-old female with history of hypertension, hypercholesteremia and osteopenia who presents urgent care today with a complaint of ongoing skin rash.  She states her symptoms started well over a week ago.  She was seen at [place] urgent care on Wednesday and provided with a bacterial cream.  She states she has been using this cream but her symptoms have not resolved.  She notes the rash is primarily on her lower abdomen bilaterally.  She states it is extremely itchy.  She denies any known irritant exposure, new foods, medications or detergents.  No other complaints or concerns mention this time.      History provided by:  Patient  Rash        Past Medical History  Allergies as of 05/17/2025 - Reviewed 05/17/2025   Allergen Reaction Noted    Albuterol Itching 06/04/2023    Budesonide-formoterol Other 02/28/2024    Erythromycin Other 02/28/2024    Ceclor [cefaclor] Rash 04/25/2023    Ciprofloxacin Rash 04/25/2023    Iodinated contrast media Unknown 04/25/2023    Penicillins Rash 04/25/2023    Quinolones Rash 04/25/2023    Zithromax z-michelle [azithromycin] Rash 04/25/2023       Prescriptions Prior to Admission[1]       Medical History[2]    Surgical History[3]     reports that she has never smoked. She has never been exposed to tobacco smoke. She has never used smokeless tobacco. She reports that she does not drink alcohol and does not use drugs.    Review of Systems  Review of Systems   Skin:  Positive for rash.                                  Objective    Vitals:    05/17/25 1529   BP: 152/72   Pulse: 62   Temp: 36.8 °C (98.2 °F)   SpO2: 95%   Weight: 75.3 kg (166 lb)     No LMP recorded. Patient is postmenopausal.    Physical Exam  Vitals and nursing note  reviewed.   Constitutional:       General: She is not in acute distress.     Appearance: Normal appearance. She is not ill-appearing, toxic-appearing or diaphoretic.   HENT:      Head: Normocephalic and atraumatic.      Mouth/Throat:      Mouth: Mucous membranes are moist.   Eyes:      Extraocular Movements: Extraocular movements intact.      Conjunctiva/sclera: Conjunctivae normal.      Pupils: Pupils are equal, round, and reactive to light.   Cardiovascular:      Rate and Rhythm: Normal rate and regular rhythm.      Pulses: Normal pulses.      Heart sounds: Normal heart sounds.   Pulmonary:      Effort: Pulmonary effort is normal. No respiratory distress.      Breath sounds: Normal breath sounds. No stridor. No wheezing, rhonchi or rales.   Chest:      Chest wall: No tenderness.   Musculoskeletal:         General: Normal range of motion.      Cervical back: Normal range of motion and neck supple.   Skin:     General: Skin is warm and dry.      Capillary Refill: Capillary refill takes less than 2 seconds.      Findings: Rash present. Rash is macular, papular and vesicular.          Neurological:      General: No focal deficit present.      Mental Status: She is alert and oriented to person, place, and time.   Psychiatric:         Mood and Affect: Mood normal.         Behavior: Behavior normal.         Procedures      Assessment/Plan   Allergies, medications, history, and pertinent labs/EKGs/Imaging reviewed by CHINA Perez.     Medical Decision Making    Patient is well appearing, afebrile, non toxic, not hypoxic, and appropriate for outpatient treatment and management at time of evaluation. Patient presents with ongoing pruritic skin rash.     Differential includes but not limited to: Allergic reaction, contact dermatitis, cellulitis, other    On exam, patient has erythematous, pruritic rash in the anterior groin/hip bilaterally as depicted above.  No active drainage.  No induration.  No fluctuance.   Rash on the left hip does have a small vesicular area.  Skin normal temperature.  Low suspicion for shingles given rash is bilateral and pruritic as opposed to painful.  Suspect localized allergic reaction.    Counseling: Spoke with the patient and discussed today´s findings, in addition to providing specific details for the plan of care and expected course.  Patient was provided with a prescription for prednisone and triamcinolone cream to use as directed.  Also recommended over-the-counter antihistamine such as Claritin or Zyrtec.  Patient was given the opportunity to ask questions.     Discussed return precautions and importance of follow-up. Advised to follow-up with PCP.  We spoke at length regarding the red flags he/she should be aware of and monitor for.  I specifically advised to go to the ED for changing or worsening symptoms, new symptoms, complaint specific precautions, and precautions listed on the discharge paperwork.    The plan of care was mutually agreed upon with the patient. All questions and concerns were answered to the best of my ability with today's information.  Patient was discharged in stable condition.    Dictation software was used in the creation of this note which does not evaluate or correct for typographical, spelling, syntax or grammatical errors.    Orders and Diagnoses  There are no diagnoses linked to this encounter.    Medical Admin Record      Follow Up Instructions  No follow-ups on file.    Patient disposition: Home    Electronically signed by CHINA Perez  3:44 PM       [1] (Not in a hospital admission)  [2] No past medical history on file.  [3]   Past Surgical History:  Procedure Laterality Date    BI MAMMO GUIDED BREAST RIGHT LOCALIZATION Right 6/10/2019    BI MAMMO GUIDED LOCALIZATION BREAST RIGHT 6/10/2019 [facility]    OTHER SURGICAL HISTORY  05/09/2019    Laparoscopy    OTHER SURGICAL HISTORY  06/27/2019    Mastectomy partial

## 2025-06-20 ENCOUNTER — TELEPHONE (OUTPATIENT)
Dept: PRIMARY CARE | Facility: CLINIC | Age: 80
End: 2025-06-20
Payer: MEDICARE

## 2025-06-20 NOTE — TELEPHONE ENCOUNTER
You put in a order for a CT Cardiac scoring and she wants to know why you ordered it and if she really needs it.

## 2025-06-22 NOTE — TELEPHONE ENCOUNTER
She can not do it if she doesn't want. To. It evaluates cardiac risk. I cannot explain it this way. She can simply not do it and discuss at a later time

## 2025-06-26 DIAGNOSIS — K21.9 GASTROESOPHAGEAL REFLUX DISEASE WITHOUT ESOPHAGITIS: ICD-10-CM

## 2025-06-26 RX ORDER — OMEPRAZOLE 20 MG/1
20 CAPSULE, DELAYED RELEASE ORAL
Qty: 90 CAPSULE | Refills: 3 | Status: SHIPPED | OUTPATIENT
Start: 2025-06-26

## 2025-08-05 ENCOUNTER — APPOINTMENT (OUTPATIENT)
Dept: RADIOLOGY | Facility: CLINIC | Age: 80
End: 2025-08-05
Payer: MEDICARE

## 2025-08-06 DIAGNOSIS — E78.00 HYPERCHOLESTEROLEMIA: ICD-10-CM

## 2025-08-07 RX ORDER — ROSUVASTATIN CALCIUM 20 MG/1
20 TABLET, COATED ORAL DAILY
Qty: 100 TABLET | Refills: 0 | Status: SHIPPED | OUTPATIENT
Start: 2025-08-07

## 2025-08-22 ENCOUNTER — OFFICE VISIT (OUTPATIENT)
Dept: URGENT CARE | Age: 80
End: 2025-08-22
Payer: MEDICARE

## 2025-08-22 VITALS
BODY MASS INDEX: 32.42 KG/M2 | HEART RATE: 68 BPM | RESPIRATION RATE: 16 BRPM | SYSTOLIC BLOOD PRESSURE: 148 MMHG | WEIGHT: 166 LBS | OXYGEN SATURATION: 97 % | DIASTOLIC BLOOD PRESSURE: 72 MMHG | TEMPERATURE: 98.4 F

## 2025-08-22 DIAGNOSIS — S09.90XA TRAUMATIC INJURY OF HEAD, INITIAL ENCOUNTER: Primary | ICD-10-CM

## 2025-08-22 PROCEDURE — 99213 OFFICE O/P EST LOW 20 MIN: CPT

## 2025-08-22 PROCEDURE — 1125F AMNT PAIN NOTED PAIN PRSNT: CPT

## 2025-08-22 PROCEDURE — 3077F SYST BP >= 140 MM HG: CPT

## 2025-08-22 PROCEDURE — 3078F DIAST BP <80 MM HG: CPT

## 2025-08-22 PROCEDURE — 1159F MED LIST DOCD IN RCRD: CPT

## 2025-08-22 PROCEDURE — 1036F TOBACCO NON-USER: CPT

## 2025-08-22 ASSESSMENT — ENCOUNTER SYMPTOMS
OCCASIONAL FEELINGS OF UNSTEADINESS: 0
LOSS OF SENSATION IN FEET: 0
DEPRESSION: 1
WOUND: 1

## 2025-08-22 ASSESSMENT — PATIENT HEALTH QUESTIONNAIRE - PHQ9
SUM OF ALL RESPONSES TO PHQ9 QUESTIONS 1 AND 2: 0
2. FEELING DOWN, DEPRESSED OR HOPELESS: NOT AT ALL
1. LITTLE INTEREST OR PLEASURE IN DOING THINGS: NOT AT ALL

## 2025-08-22 ASSESSMENT — PAIN SCALES - GENERAL: PAINLEVEL_OUTOF10: 4
